# Patient Record
Sex: FEMALE | Race: ASIAN | NOT HISPANIC OR LATINO | URBAN - METROPOLITAN AREA
[De-identification: names, ages, dates, MRNs, and addresses within clinical notes are randomized per-mention and may not be internally consistent; named-entity substitution may affect disease eponyms.]

---

## 2017-07-29 ENCOUNTER — EMERGENCY (EMERGENCY)
Facility: HOSPITAL | Age: 22
LOS: 1 days | Discharge: HOME | End: 2017-07-29
Attending: EMERGENCY MEDICINE | Admitting: EMERGENCY MEDICINE
Payer: MEDICAID

## 2017-07-29 VITALS
RESPIRATION RATE: 16 BRPM | OXYGEN SATURATION: 100 % | SYSTOLIC BLOOD PRESSURE: 108 MMHG | HEART RATE: 82 BPM | TEMPERATURE: 98 F | DIASTOLIC BLOOD PRESSURE: 71 MMHG

## 2017-07-29 VITALS
HEART RATE: 95 BPM | DIASTOLIC BLOOD PRESSURE: 62 MMHG | SYSTOLIC BLOOD PRESSURE: 121 MMHG | RESPIRATION RATE: 16 BRPM | OXYGEN SATURATION: 100 %

## 2017-07-29 LAB
ALBUMIN SERPL ELPH-MCNC: 4.8 G/DL — SIGNIFICANT CHANGE UP (ref 3.3–5)
ALP SERPL-CCNC: 55 U/L — SIGNIFICANT CHANGE UP (ref 40–120)
ALT FLD-CCNC: 10 U/L — SIGNIFICANT CHANGE UP (ref 4–33)
APPEARANCE UR: CLEAR — SIGNIFICANT CHANGE UP
AST SERPL-CCNC: 22 U/L — SIGNIFICANT CHANGE UP (ref 4–32)
BASE EXCESS BLDV CALC-SCNC: 1.7 MMOL/L — SIGNIFICANT CHANGE UP
BASOPHILS # BLD AUTO: 0.03 K/UL — SIGNIFICANT CHANGE UP (ref 0–0.2)
BASOPHILS NFR BLD AUTO: 0.3 % — SIGNIFICANT CHANGE UP (ref 0–2)
BILIRUB SERPL-MCNC: 0.4 MG/DL — SIGNIFICANT CHANGE UP (ref 0.2–1.2)
BILIRUB UR-MCNC: NEGATIVE — SIGNIFICANT CHANGE UP
BLOOD GAS VENOUS - CREATININE: 0.83 MG/DL — SIGNIFICANT CHANGE UP (ref 0.5–1.3)
BLOOD UR QL VISUAL: NEGATIVE — SIGNIFICANT CHANGE UP
BUN SERPL-MCNC: 10 MG/DL — SIGNIFICANT CHANGE UP (ref 7–23)
CALCIUM SERPL-MCNC: 9.7 MG/DL — SIGNIFICANT CHANGE UP (ref 8.4–10.5)
CHLORIDE BLDV-SCNC: 105 MMOL/L — SIGNIFICANT CHANGE UP (ref 96–108)
CHLORIDE SERPL-SCNC: 103 MMOL/L — SIGNIFICANT CHANGE UP (ref 98–107)
CO2 SERPL-SCNC: 23 MMOL/L — SIGNIFICANT CHANGE UP (ref 22–31)
COLOR SPEC: YELLOW — SIGNIFICANT CHANGE UP
CREAT SERPL-MCNC: 0.86 MG/DL — SIGNIFICANT CHANGE UP (ref 0.5–1.3)
EOSINOPHIL # BLD AUTO: 0.06 K/UL — SIGNIFICANT CHANGE UP (ref 0–0.5)
EOSINOPHIL NFR BLD AUTO: 0.6 % — SIGNIFICANT CHANGE UP (ref 0–6)
GAS PNL BLDV: 138 MMOL/L — SIGNIFICANT CHANGE UP (ref 136–146)
GLUCOSE BLDV-MCNC: 89 — SIGNIFICANT CHANGE UP (ref 70–99)
GLUCOSE SERPL-MCNC: 85 MG/DL — SIGNIFICANT CHANGE UP (ref 70–99)
GLUCOSE UR-MCNC: NEGATIVE — SIGNIFICANT CHANGE UP
HCO3 BLDV-SCNC: 24 MMOL/L — SIGNIFICANT CHANGE UP (ref 20–27)
HCT VFR BLD CALC: 37.3 % — SIGNIFICANT CHANGE UP (ref 34.5–45)
HCT VFR BLDV CALC: 39.1 % — SIGNIFICANT CHANGE UP (ref 34.5–45)
HGB BLD-MCNC: 12.5 G/DL — SIGNIFICANT CHANGE UP (ref 11.5–15.5)
HGB BLDV-MCNC: 12.7 G/DL — SIGNIFICANT CHANGE UP (ref 11.5–15.5)
IMM GRANULOCYTES # BLD AUTO: 0.02 # — SIGNIFICANT CHANGE UP
IMM GRANULOCYTES NFR BLD AUTO: 0.2 % — SIGNIFICANT CHANGE UP (ref 0–1.5)
KETONES UR-MCNC: NEGATIVE — SIGNIFICANT CHANGE UP
LACTATE BLDV-MCNC: 2 MMOL/L — SIGNIFICANT CHANGE UP (ref 0.5–2)
LEUKOCYTE ESTERASE UR-ACNC: HIGH
LYMPHOCYTES # BLD AUTO: 2.95 K/UL — SIGNIFICANT CHANGE UP (ref 1–3.3)
LYMPHOCYTES # BLD AUTO: 30.1 % — SIGNIFICANT CHANGE UP (ref 13–44)
MCHC RBC-ENTMCNC: 30.8 PG — SIGNIFICANT CHANGE UP (ref 27–34)
MCHC RBC-ENTMCNC: 33.5 % — SIGNIFICANT CHANGE UP (ref 32–36)
MCV RBC AUTO: 91.9 FL — SIGNIFICANT CHANGE UP (ref 80–100)
MONOCYTES # BLD AUTO: 0.63 K/UL — SIGNIFICANT CHANGE UP (ref 0–0.9)
MONOCYTES NFR BLD AUTO: 6.4 % — SIGNIFICANT CHANGE UP (ref 2–14)
MUCOUS THREADS # UR AUTO: SIGNIFICANT CHANGE UP
NEUTROPHILS # BLD AUTO: 6.12 K/UL — SIGNIFICANT CHANGE UP (ref 1.8–7.4)
NEUTROPHILS NFR BLD AUTO: 62.4 % — SIGNIFICANT CHANGE UP (ref 43–77)
NITRITE UR-MCNC: NEGATIVE — SIGNIFICANT CHANGE UP
NRBC # FLD: 0 — SIGNIFICANT CHANGE UP
PCO2 BLDV: 56 MMHG — HIGH (ref 41–51)
PH BLDV: 7.31 PH — LOW (ref 7.32–7.43)
PH UR: 7.5 — SIGNIFICANT CHANGE UP (ref 4.6–8)
PLATELET # BLD AUTO: 203 K/UL — SIGNIFICANT CHANGE UP (ref 150–400)
PMV BLD: 9.7 FL — SIGNIFICANT CHANGE UP (ref 7–13)
PO2 BLDV: 32 MMHG — LOW (ref 35–40)
POTASSIUM BLDV-SCNC: 3.6 MMOL/L — SIGNIFICANT CHANGE UP (ref 3.4–4.5)
POTASSIUM SERPL-MCNC: 4.1 MMOL/L — SIGNIFICANT CHANGE UP (ref 3.5–5.3)
POTASSIUM SERPL-SCNC: 4.1 MMOL/L — SIGNIFICANT CHANGE UP (ref 3.5–5.3)
PROT SERPL-MCNC: 7.2 G/DL — SIGNIFICANT CHANGE UP (ref 6–8.3)
PROT UR-MCNC: 20 — SIGNIFICANT CHANGE UP
RBC # BLD: 4.06 M/UL — SIGNIFICANT CHANGE UP (ref 3.8–5.2)
RBC # FLD: 12.1 % — SIGNIFICANT CHANGE UP (ref 10.3–14.5)
RBC CASTS # UR COMP ASSIST: SIGNIFICANT CHANGE UP (ref 0–?)
SAO2 % BLDV: 50.8 % — LOW (ref 60–85)
SODIUM SERPL-SCNC: 143 MMOL/L — SIGNIFICANT CHANGE UP (ref 135–145)
SP GR SPEC: 1.03 — SIGNIFICANT CHANGE UP (ref 1–1.03)
SQUAMOUS # UR AUTO: SIGNIFICANT CHANGE UP
UROBILINOGEN FLD QL: NORMAL E.U. — SIGNIFICANT CHANGE UP (ref 0.1–0.2)
WBC # BLD: 9.81 K/UL — SIGNIFICANT CHANGE UP (ref 3.8–10.5)
WBC # FLD AUTO: 9.81 K/UL — SIGNIFICANT CHANGE UP (ref 3.8–10.5)
WBC UR QL: SIGNIFICANT CHANGE UP (ref 0–?)

## 2017-07-29 PROCEDURE — 76705 ECHO EXAM OF ABDOMEN: CPT | Mod: 26

## 2017-07-29 PROCEDURE — 76856 US EXAM PELVIC COMPLETE: CPT | Mod: 26

## 2017-07-29 PROCEDURE — 99285 EMERGENCY DEPT VISIT HI MDM: CPT

## 2017-07-29 RX ORDER — ACETAMINOPHEN 500 MG
1000 TABLET ORAL ONCE
Qty: 0 | Refills: 0 | Status: COMPLETED | OUTPATIENT
Start: 2017-07-29 | End: 2017-07-29

## 2017-07-29 RX ORDER — METOCLOPRAMIDE HCL 10 MG
10 TABLET ORAL ONCE
Qty: 0 | Refills: 0 | Status: DISCONTINUED | OUTPATIENT
Start: 2017-07-29 | End: 2017-07-29

## 2017-07-29 RX ORDER — METOCLOPRAMIDE HCL 10 MG
10 TABLET ORAL ONCE
Qty: 0 | Refills: 0 | Status: COMPLETED | OUTPATIENT
Start: 2017-07-29 | End: 2017-07-29

## 2017-07-29 RX ADMIN — Medication 400 MILLIGRAM(S): at 20:33

## 2017-07-29 NOTE — ED PROVIDER NOTE - ATTENDING CONTRIBUTION TO CARE
I performed a face to face evaluation of this patient and performed a full history and physical examination on the patient.  I agree with the PA history, physical examination, and plan of the patient.  21yo F p/w acute onset RLQ pain beginning last night, constant, worse with movement, no injury, no prior similar, mild nausea, no vomiting/diarrhea/constipation, no urinary symptoms, no vaginal bleeding/discharge, no similar symptoms.  On exam awake & alert, NAD., mmm, lungs CTAB no wheeze no crackle, RRR, abdomen soft R pelvic tenderness no rebound no guarding, (-) obturators, no CVA tenderness, no edema, no calf tenderness, 2+ pulses b/l, neuro A&Ox3, skin warm and dry no rash  Patient declines pelvic exam and requests transabdominal u/s, explained to patient benefit of tv u/s, patient without prior pelvic exam or intercourse.

## 2017-07-29 NOTE — ED PROVIDER NOTE - PHYSICAL EXAMINATION
The patients is deferring pelvic exam - discussed the risks vs benefits - pt has capacity to make medical decisions.

## 2017-07-29 NOTE — ED PROVIDER NOTE - PROGRESS NOTE DETAILS
TOMÁS Diaz: The patients is deferring pelvic exam - discussed the risks vs benefits - pt has capacity to make medical decisions. TOMÁS Diaz: pt NAD, VSS, pain has improved after medication - pt requesting food. Discussed the inconclusive results of the ultrasound - pt has capacity to make her own medical decisions - discussed risks verses benefits of obtaining scan. Pt refusing CT at this time to r/o appendicitis. Pt advised to return immediately if pain worsens or gets fevers.

## 2017-07-29 NOTE — ED PROVIDER NOTE - PLAN OF CARE
Follow up with your primary care physician within 1 week for post hospital discharge. Take Tylenol 650mg every 6 hours as needed for pain. Return to the emergency Department immediately for any new onset of fevers, worsening abdominal pain.

## 2017-07-29 NOTE — ED ADULT NURSE NOTE - CHIEF COMPLAINT
The patient is a 22y Female complaining of abdominal pain since last night, denies n/v/d, fever, chills.

## 2017-07-29 NOTE — ED ADULT NURSE REASSESSMENT NOTE - NS ED NURSE REASSESS COMMENT FT1
US contacted, patient remains in US at this time pending pelvic US. MD aware. Patient agreeable to staying/waiting in uS as per US worker.

## 2017-07-29 NOTE — ED ADULT TRIAGE NOTE - CHIEF COMPLAINT QUOTE
Patient c/o of right lower quadrant pain since last night with nausea no diarrhea. Pt tender on palpation. LMP 7/16/2017

## 2017-07-29 NOTE — ED ADULT NURSE NOTE - OBJECTIVE STATEMENT
Received pt in intake 2, ambulatory, pt A&Ox4, respirations even and unlabored b/l. Abdomen soft, nondistended, tender RLQ. IVL 20g Angiocath placed on left AC. Labs sent. Medicated as per MD order. Awaiting US. Will continue to monitor.

## 2017-07-29 NOTE — ED PROVIDER NOTE - CARE PLAN
Instructions for follow-up, activity and diet:	Follow up with your primary care physician within 1 week for post hospital discharge. Take Tylenol 650mg every 6 hours as needed for pain. Return to the emergency Department immediately for any new onset of fevers, worsening abdominal pain. Principal Discharge DX:	Abdominal pain  Instructions for follow-up, activity and diet:	Follow up with your primary care physician within 1 week for post hospital discharge. Take Tylenol 650mg every 6 hours as needed for pain. Return to the emergency Department immediately for any new onset of fevers, worsening abdominal pain.

## 2017-07-29 NOTE — ED PROVIDER NOTE - MEDICAL DECISION MAKING DETAILS
22 year old female with no pertinent PMHx pw RLQ pain that began last night with associated.  Plan: cbc, cmp, blood gas, reglan, UA, urine culture, US appendix/TVUS

## 2017-07-29 NOTE — ED PROVIDER NOTE - OBJECTIVE STATEMENT
22 year old female with no pertinent PMHx pw RLQ pain that began last night with associated. Pain began periumbilically and migrated to the RLQ, 5/10 in severity, dull in nature - sharp during palpation, worse when coughing, no alleviating factors. LMP 7/16/2016. Last bowel movement this morning - pt was constipated - passing gas. Denies f/c, vomiting, CP, SOB, dysuria, hematuria, diarrhea, melena, hematochezia, vaginal odor/dc/itching, sexual activity, alcohol use, abdominal surgery.

## 2017-07-31 LAB
BACTERIA UR CULT: SIGNIFICANT CHANGE UP
SPECIMEN SOURCE: SIGNIFICANT CHANGE UP

## 2018-11-01 NOTE — ED ADULT NURSE NOTE - PAIN: PRESENCE, MLM
complains of pain/discomfort
Anesthesia Type: 1% lidocaine with 1:200,000 epinephrine
Add 52 Modifier (Optional): no
Medical Necessity Clause: This procedure was medically necessary because the lesions that were treated were:irritated
Consent: The patient's consent was obtained including but not limited to risks of crusting, scabbing, blistering, scarring, darker or lighter pigmentary change, recurrence, incomplete removal and infection.
Medical Necessity Information: It is in your best interest to select a reason for this procedure from the list below. All of these items fulfill various CMS LCD requirements except the new and changing color options.
Anesthesia Volume In Cc: 0.3
Treatment Number (Will Not Render If 0): 0
Detail Level: Detailed
Post-Care Instructions: I reviewed with the patient in detail post-care instructions. Patient is to wear sunprotection, and avoid picking at any of the treated lesions. Pt may apply Vaseline to crusted or scabbing areas.

## 2019-10-03 ENCOUNTER — APPOINTMENT (OUTPATIENT)
Dept: DERMATOLOGY | Facility: CLINIC | Age: 24
End: 2019-10-03
Payer: COMMERCIAL

## 2019-10-03 VITALS — WEIGHT: 145 LBS | HEIGHT: 66 IN | BODY MASS INDEX: 23.3 KG/M2

## 2019-10-03 DIAGNOSIS — L23.9 ALLERGIC CONTACT DERMATITIS, UNSPECIFIED CAUSE: ICD-10-CM

## 2019-10-03 DIAGNOSIS — Z77.22 CONTACT WITH AND (SUSPECTED) EXPOSURE TO ENVIRONMENTAL TOBACCO SMOKE (ACUTE) (CHRONIC): ICD-10-CM

## 2019-10-03 PROBLEM — Z00.00 ENCOUNTER FOR PREVENTIVE HEALTH EXAMINATION: Status: ACTIVE | Noted: 2019-10-03

## 2019-10-03 PROCEDURE — 99203 OFFICE O/P NEW LOW 30 MIN: CPT | Mod: GC

## 2019-10-03 RX ORDER — BETAMETHASONE DIPROPIONATE 0.5 MG/G
0.05 OINTMENT, AUGMENTED TOPICAL
Qty: 1 | Refills: 2 | Status: ACTIVE | COMMUNITY
Start: 2019-10-03 | End: 1900-01-01

## 2019-10-03 RX ORDER — TACROLIMUS 1 MG/G
0.1 OINTMENT TOPICAL
Qty: 1 | Refills: 1 | Status: ACTIVE | COMMUNITY
Start: 2019-10-03 | End: 1900-01-01

## 2019-10-04 ENCOUNTER — MEDICATION RENEWAL (OUTPATIENT)
Age: 24
End: 2019-10-04

## 2019-10-04 RX ORDER — HALOBETASOL PROPIONATE 0.5 MG/G
0.05 OINTMENT TOPICAL
Qty: 1 | Refills: 2 | Status: ACTIVE | COMMUNITY
Start: 2019-10-04 | End: 1900-01-01

## 2022-02-16 NOTE — ED PROVIDER NOTE - CROS ED GI ALL NEG
Patient:   KM BRUCE            MRN: [id number]            FIN: [id number]               Age:   37 years     Sex:  Female     :  1983   Associated Diagnoses:   Viral URI with cough   Author:   Bhavik Epps PA-C      Report Summary   Diagnosis  Viral URI with cough (JED02-EU J06.9).  Patient InstructionsSummary   Visit Information   Visit type:  Telephone Encounter.    Source of history:  Patient.    Location of patient:  Home  Call Start Time:   8:45  Call End Time:    9:00      Chief Complaint   cough      History of Present Illness   Today's visit was conducted via telephone due to the COVID-19 pandemic.     Reason for visit:  Cough, fever. Some chest tightness. No SOB. No underlying respiratory conditions. 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      Review of Systems   Constitutional:  Fever.    Eye:  Negative.    Ear/Nose/Mouth/Throat:  Nasal congestion.    Respiratory:  Cough, No shortness of breath, No wheezing.       Impression and Plan   Diagnosis     Viral URI with cough ([id number] J06.9).     Patient Instructions:       Counseled: Patient, Regarding diagnosis, Regarding treatment, Regarding medications, Activity, Verbalized understanding.    Summary:    Tylenol for fever and discomfort.  Push fluids.  RTC if not improving in 36-48 hours, prior if concerns as we have discussed.  .    Quarantine for 2 weeks.      Health Status   Allergies:    Allergic Reactions (Selected)  No Known Medication Allergies   Medications:  (Selected)   Prescriptions  Prescribed  FLUoxetine 40 mg oral capsule: = 1 cap(s) ( 40 mg ), Oral, daily, # 90 cap(s), 3 Refill(s), Type: Maintenance, Pharmacy: [id number], 1 cap(s) Oral daily   Problem list:    All Problems  Obese / ICD-9-.00 / Probable  Moderate Major Depression / ICD-9-.22 / Confirmed  Macromastia / SNOMED CT 5211928063 / Confirmed  Resolved: Tobacco user / ICD-9-.1  Resolved: Pregnancy / SNOMED CT 165020844  
   Patient:   KM BRUCE            MRN: [id number]            FIN: [id number]               Age:   37 years     Sex:  Female     :  1983   Associated Diagnoses:   None   Author:   Bhavik Epps PA-C       -   Today's date:  3/24/2020 8:59:00 AM .        -   To whom it may concern:        This patient is currently under my care and Was seen in my office on  3/24/2020.  .     Please excuse him/ her from work, for the next  2  weeks, Off work for suspected COVID-19 infection..      -   Best regards,  
- - -

## 2022-09-09 ENCOUNTER — NON-APPOINTMENT (OUTPATIENT)
Age: 27
End: 2022-09-09

## 2022-09-14 ENCOUNTER — APPOINTMENT (OUTPATIENT)
Dept: OBGYN | Facility: CLINIC | Age: 27
End: 2022-09-14

## 2022-09-14 ENCOUNTER — ASOB RESULT (OUTPATIENT)
Age: 27
End: 2022-09-14

## 2022-09-14 VITALS
WEIGHT: 166 LBS | SYSTOLIC BLOOD PRESSURE: 108 MMHG | HEIGHT: 66 IN | DIASTOLIC BLOOD PRESSURE: 73 MMHG | BODY MASS INDEX: 26.68 KG/M2

## 2022-09-14 DIAGNOSIS — Z3A.08 8 WEEKS GESTATION OF PREGNANCY: ICD-10-CM

## 2022-09-14 PROCEDURE — 0500F INITIAL PRENATAL CARE VISIT: CPT

## 2022-09-14 PROCEDURE — 36415 COLL VENOUS BLD VENIPUNCTURE: CPT

## 2022-09-17 ENCOUNTER — TRANSCRIPTION ENCOUNTER (OUTPATIENT)
Age: 27
End: 2022-09-17

## 2022-09-26 LAB
ABO + RH PNL BLD: NORMAL
ALBUMIN SERPL ELPH-MCNC: 4.8 G/DL
ALP BLD-CCNC: 58 U/L
ALT SERPL-CCNC: 8 U/L
ANION GAP SERPL CALC-SCNC: 18 MMOL/L
AST SERPL-CCNC: 19 U/L
BACTERIA UR CULT: NORMAL
BASOPHILS # BLD AUTO: 0.03 K/UL
BASOPHILS NFR BLD AUTO: 0.4 %
BILIRUB SERPL-MCNC: 0.3 MG/DL
BLD GP AB SCN SERPL QL: NORMAL
BUN SERPL-MCNC: 5 MG/DL
C TRACH RRNA SPEC QL NAA+PROBE: NOT DETECTED
CALCIUM SERPL-MCNC: 9.8 MG/DL
CHLORIDE SERPL-SCNC: 102 MMOL/L
CO2 SERPL-SCNC: 20 MMOL/L
CREAT SERPL-MCNC: 0.63 MG/DL
CYTOLOGY CVX/VAG DOC THIN PREP: NORMAL
EGFR: 125 ML/MIN/1.73M2
EOSINOPHIL # BLD AUTO: 0.1 K/UL
EOSINOPHIL NFR BLD AUTO: 1.5 %
GLUCOSE SERPL-MCNC: 53 MG/DL
HBV SURFACE AG SER QL: NONREACTIVE
HCT VFR BLD CALC: 39 %
HCV AB SER QL: NONREACTIVE
HCV S/CO RATIO: 0.09 S/CO
HGB A MFR BLD: 97.4 %
HGB A2 MFR BLD: 2.6 %
HGB BLD-MCNC: 12.8 G/DL
HGB FRACT BLD-IMP: NORMAL
HIV1+2 AB SPEC QL IA.RAPID: NONREACTIVE
IMM GRANULOCYTES NFR BLD AUTO: 0.3 %
LEAD BLD-MCNC: <1 UG/DL
LYMPHOCYTES # BLD AUTO: 1.58 K/UL
LYMPHOCYTES NFR BLD AUTO: 23.3 %
MAN DIFF?: NORMAL
MCHC RBC-ENTMCNC: 31.1 PG
MCHC RBC-ENTMCNC: 32.8 GM/DL
MCV RBC AUTO: 94.9 FL
MEV IGG FLD QL IA: >300 AU/ML
MEV IGG+IGM SER-IMP: POSITIVE
MONOCYTES # BLD AUTO: 0.56 K/UL
MONOCYTES NFR BLD AUTO: 8.3 %
N GONORRHOEA RRNA SPEC QL NAA+PROBE: NOT DETECTED
NEUTROPHILS # BLD AUTO: 4.48 K/UL
NEUTROPHILS NFR BLD AUTO: 66.2 %
PLATELET # BLD AUTO: 259 K/UL
POTASSIUM SERPL-SCNC: 3.8 MMOL/L
PROT SERPL-MCNC: 7.2 G/DL
RBC # BLD: 4.11 M/UL
RBC # FLD: 13.3 %
RUBV IGG FLD-ACNC: 5.6 INDEX
RUBV IGG SER-IMP: POSITIVE
SODIUM SERPL-SCNC: 139 MMOL/L
SOURCE TP AMPLIFICATION: NORMAL
T PALLIDUM AB SER QL IA: NEGATIVE
T4 FREE SERPL-MCNC: 1.4 NG/DL
TSH SERPL-ACNC: 0.91 UIU/ML
VZV AB TITR SER: POSITIVE
VZV IGG SER IF-ACNC: 217.3 INDEX
WBC # FLD AUTO: 6.77 K/UL

## 2022-10-07 ENCOUNTER — APPOINTMENT (OUTPATIENT)
Dept: OBGYN | Facility: CLINIC | Age: 27
End: 2022-10-07

## 2022-10-07 VITALS
BODY MASS INDEX: 26.84 KG/M2 | DIASTOLIC BLOOD PRESSURE: 75 MMHG | SYSTOLIC BLOOD PRESSURE: 109 MMHG | WEIGHT: 167 LBS | HEIGHT: 66 IN

## 2022-10-07 DIAGNOSIS — Z3A.10 10 WEEKS GESTATION OF PREGNANCY: ICD-10-CM

## 2022-10-07 PROCEDURE — 0502F SUBSEQUENT PRENATAL CARE: CPT

## 2022-10-07 PROCEDURE — 36415 COLL VENOUS BLD VENIPUNCTURE: CPT

## 2022-10-14 ENCOUNTER — EMERGENCY (EMERGENCY)
Facility: HOSPITAL | Age: 27
LOS: 1 days | Discharge: HOME | End: 2022-10-14
Attending: EMERGENCY MEDICINE
Payer: COMMERCIAL

## 2022-10-14 PROCEDURE — 99285 EMERGENCY DEPT VISIT HI MDM: CPT

## 2022-10-15 VITALS
HEART RATE: 86 BPM | OXYGEN SATURATION: 100 % | RESPIRATION RATE: 16 BRPM | DIASTOLIC BLOOD PRESSURE: 72 MMHG | SYSTOLIC BLOOD PRESSURE: 120 MMHG | TEMPERATURE: 98 F

## 2022-10-15 VITALS
RESPIRATION RATE: 17 BRPM | HEART RATE: 122 BPM | OXYGEN SATURATION: 100 % | SYSTOLIC BLOOD PRESSURE: 153 MMHG | TEMPERATURE: 98 F | HEIGHT: 66 IN | WEIGHT: 166.89 LBS | DIASTOLIC BLOOD PRESSURE: 82 MMHG

## 2022-10-15 LAB
ALBUMIN SERPL ELPH-MCNC: 4.2 G/DL — SIGNIFICANT CHANGE UP (ref 3.3–5)
ALP SERPL-CCNC: 54 U/L — SIGNIFICANT CHANGE UP (ref 40–120)
ALT FLD-CCNC: 9 U/L — LOW (ref 10–45)
ANION GAP SERPL CALC-SCNC: 10 MMOL/L — SIGNIFICANT CHANGE UP (ref 5–17)
APPEARANCE UR: CLEAR — SIGNIFICANT CHANGE UP
AST SERPL-CCNC: 14 U/L — SIGNIFICANT CHANGE UP (ref 10–40)
BACTERIA # UR AUTO: NEGATIVE — SIGNIFICANT CHANGE UP
BASOPHILS # BLD AUTO: 0.02 K/UL — SIGNIFICANT CHANGE UP (ref 0–0.2)
BASOPHILS NFR BLD AUTO: 0.2 % — SIGNIFICANT CHANGE UP (ref 0–2)
BILIRUB SERPL-MCNC: 0.1 MG/DL — LOW (ref 0.2–1.2)
BILIRUB UR-MCNC: NEGATIVE — SIGNIFICANT CHANGE UP
BUN SERPL-MCNC: 8 MG/DL — SIGNIFICANT CHANGE UP (ref 7–23)
CALCIUM SERPL-MCNC: 9.2 MG/DL — SIGNIFICANT CHANGE UP (ref 8.4–10.5)
CHLORIDE SERPL-SCNC: 106 MMOL/L — SIGNIFICANT CHANGE UP (ref 96–108)
CO2 SERPL-SCNC: 22 MMOL/L — SIGNIFICANT CHANGE UP (ref 22–31)
COLOR SPEC: COLORLESS — SIGNIFICANT CHANGE UP
CREAT SERPL-MCNC: 0.57 MG/DL — SIGNIFICANT CHANGE UP (ref 0.5–1.3)
DIFF PNL FLD: ABNORMAL
EGFR: 128 ML/MIN/1.73M2 — SIGNIFICANT CHANGE UP
EOSINOPHIL # BLD AUTO: 0.19 K/UL — SIGNIFICANT CHANGE UP (ref 0–0.5)
EOSINOPHIL NFR BLD AUTO: 2 % — SIGNIFICANT CHANGE UP (ref 0–6)
EPI CELLS # UR: 1 /HPF — SIGNIFICANT CHANGE UP
GLUCOSE SERPL-MCNC: 86 MG/DL — SIGNIFICANT CHANGE UP (ref 70–99)
GLUCOSE UR QL: NEGATIVE — SIGNIFICANT CHANGE UP
HCG SERPL-ACNC: HIGH MIU/ML
HCT VFR BLD CALC: 35.4 % — SIGNIFICANT CHANGE UP (ref 34.5–45)
HGB BLD-MCNC: 11.7 G/DL — SIGNIFICANT CHANGE UP (ref 11.5–15.5)
HYALINE CASTS # UR AUTO: 1 /LPF — SIGNIFICANT CHANGE UP (ref 0–2)
IMM GRANULOCYTES NFR BLD AUTO: 0.4 % — SIGNIFICANT CHANGE UP (ref 0–0.9)
KETONES UR-MCNC: NEGATIVE — SIGNIFICANT CHANGE UP
LEUKOCYTE ESTERASE UR-ACNC: ABNORMAL
LYMPHOCYTES # BLD AUTO: 2.01 K/UL — SIGNIFICANT CHANGE UP (ref 1–3.3)
LYMPHOCYTES # BLD AUTO: 20.8 % — SIGNIFICANT CHANGE UP (ref 13–44)
MCHC RBC-ENTMCNC: 30.5 PG — SIGNIFICANT CHANGE UP (ref 27–34)
MCHC RBC-ENTMCNC: 33.1 GM/DL — SIGNIFICANT CHANGE UP (ref 32–36)
MCV RBC AUTO: 92.4 FL — SIGNIFICANT CHANGE UP (ref 80–100)
MONOCYTES # BLD AUTO: 0.77 K/UL — SIGNIFICANT CHANGE UP (ref 0–0.9)
MONOCYTES NFR BLD AUTO: 8 % — SIGNIFICANT CHANGE UP (ref 2–14)
NEUTROPHILS # BLD AUTO: 6.63 K/UL — SIGNIFICANT CHANGE UP (ref 1.8–7.4)
NEUTROPHILS NFR BLD AUTO: 68.6 % — SIGNIFICANT CHANGE UP (ref 43–77)
NITRITE UR-MCNC: NEGATIVE — SIGNIFICANT CHANGE UP
NRBC # BLD: 0 /100 WBCS — SIGNIFICANT CHANGE UP (ref 0–0)
PH UR: 6.5 — SIGNIFICANT CHANGE UP (ref 5–8)
PLATELET # BLD AUTO: 258 K/UL — SIGNIFICANT CHANGE UP (ref 150–400)
POTASSIUM SERPL-MCNC: 3.6 MMOL/L — SIGNIFICANT CHANGE UP (ref 3.5–5.3)
POTASSIUM SERPL-SCNC: 3.6 MMOL/L — SIGNIFICANT CHANGE UP (ref 3.5–5.3)
PROT SERPL-MCNC: 7.1 G/DL — SIGNIFICANT CHANGE UP (ref 6–8.3)
PROT UR-MCNC: NEGATIVE — SIGNIFICANT CHANGE UP
RBC # BLD: 3.83 M/UL — SIGNIFICANT CHANGE UP (ref 3.8–5.2)
RBC # FLD: 13.1 % — SIGNIFICANT CHANGE UP (ref 10.3–14.5)
RBC CASTS # UR COMP ASSIST: 2 /HPF — SIGNIFICANT CHANGE UP (ref 0–4)
SODIUM SERPL-SCNC: 138 MMOL/L — SIGNIFICANT CHANGE UP (ref 135–145)
SP GR SPEC: 1 — LOW (ref 1.01–1.02)
UROBILINOGEN FLD QL: NEGATIVE — SIGNIFICANT CHANGE UP
WBC # BLD: 9.66 K/UL — SIGNIFICANT CHANGE UP (ref 3.8–10.5)
WBC # FLD AUTO: 9.66 K/UL — SIGNIFICANT CHANGE UP (ref 3.8–10.5)
WBC UR QL: 3 /HPF — SIGNIFICANT CHANGE UP (ref 0–5)

## 2022-10-15 PROCEDURE — 36415 COLL VENOUS BLD VENIPUNCTURE: CPT

## 2022-10-15 PROCEDURE — 76817 TRANSVAGINAL US OBSTETRIC: CPT | Mod: 26

## 2022-10-15 PROCEDURE — 87086 URINE CULTURE/COLONY COUNT: CPT

## 2022-10-15 PROCEDURE — 99284 EMERGENCY DEPT VISIT MOD MDM: CPT | Mod: 25

## 2022-10-15 PROCEDURE — 85025 COMPLETE CBC W/AUTO DIFF WBC: CPT

## 2022-10-15 PROCEDURE — 76801 OB US < 14 WKS SINGLE FETUS: CPT

## 2022-10-15 PROCEDURE — 80053 COMPREHEN METABOLIC PANEL: CPT

## 2022-10-15 PROCEDURE — 84702 CHORIONIC GONADOTROPIN TEST: CPT

## 2022-10-15 PROCEDURE — 81001 URINALYSIS AUTO W/SCOPE: CPT

## 2022-10-15 PROCEDURE — 76817 TRANSVAGINAL US OBSTETRIC: CPT

## 2022-10-15 PROCEDURE — 76801 OB US < 14 WKS SINGLE FETUS: CPT | Mod: 26

## 2022-10-15 NOTE — ED PROVIDER NOTE - PATIENT PORTAL LINK FT
You can access the FollowMyHealth Patient Portal offered by [facility] by registering at the following website: [email or web address]. By joining [employer]’s FollowMyHealth portal, you will also be able to view your health information using other applications (apps) compatible with our system. You can access the FollowMyHealth Patient Portal offered by [facility] by registering at the following website: [email or web address]. By joining [employer]’s FollowMyHealth portal, you will also be able to view your health information using other applications (apps) compatible with our system. You can access the FollowMyHealth Patient Portal offered by [facility] by registering at the following website: [email or web address]. By joining [employer]’s FollowMyHealth portal, you will also be able to view your health information using other applications (apps) compatible with our system.

## 2022-10-15 NOTE — ED PROVIDER NOTE - PHYSICAL EXAMINATION
PHYSICAL EXAM:  GENERAL: Sitting comfortable in bed, in no acute distress  HENMT: Atraumatic, moist mucous membranes EYES: Clear bilaterally, PERRL, EOMs intact b/l  HEART: RRR, S1/S2, no murmur  RESPIRATORY: Clear to auscultation bilaterally, no wheezes/rhonchi/rales  ABDOMEN: +BS, soft, nontender, nondistended  PELVIC EXAM: with RN -   EXTREMITIES: No lower extremity edema, +2 radial pulses b/l  NEURO: A&Ox4  SKIN: Skin normal color for race, warm, dry and intact PHYSICAL EXAM:  GENERAL: Sitting comfortable in bed, in no acute distress  HENMT: Atraumatic, moist mucous membranes EYES: Clear bilaterally, PERRL, EOMs intact b/l  HEART: RRR, S1/S2, no murmur  RESPIRATORY: Clear to auscultation bilaterally, no wheezes/rhonchi/rales  ABDOMEN: +BS, soft, nontender, nondistended  PELVIC EXAM: with RN - normal external genitalia, normal vaginal vault with some blood, cervix could not be visualized, no cervical motion tenderness, no adnexal tenderness  EXTREMITIES: No lower extremity edema, +2 radial pulses b/l  NEURO: A&Ox4  SKIN: Skin normal color for race, warm, dry and intact

## 2022-10-15 NOTE — ED PROVIDER NOTE - ATTENDING CONTRIBUTION TO CARE
27yr F G1 11wk US confirmed IUP presented with vaginal spotting. denies cramps fever chills, feeling unwell, cp, sob, abd pain or diarrhea, or urinary sx. has report of O + blood in chart. exam notable for well appearing, neuro grossly intact, clear lungs, s1-2 soft non tender abd, no peripheral edema.  plan for pelvic, US, labs, and if work up unremarkable. advise regarding threatened  in 1st trimester. pt is comfortable following up as outpt.

## 2022-10-15 NOTE — ED PROVIDER NOTE - NSFOLLOWUPINSTRUCTIONS_ED_ALL_ED_FT
You have been evaluated in the Emergency Department today for vaginal bleeding in the setting of pregnancy.     Please see your Obstetrician on Monday to follow-up.    Return to the Emergency Department if you experience heavy vaginal bleeding, abdominal pain or cramping, fevers 100.4°F or greater, recurrent vomiting, dizziness, or any other concerning symptoms.    Thank you for choosing us for your care.

## 2022-10-15 NOTE — ED PROVIDER NOTE - PROGRESS NOTE DETAILS
Lona De La Rosa M.D. (Resident Physician): US did not show any abnormalities. Pt still reports no pain or cramping. Will f/u with her OB on Monday and has been given return precautions. Ready for d/c home.

## 2022-10-15 NOTE — ED ADULT NURSE NOTE - OBJECTIVE STATEMENT
28y/o f coming to the ED c.o vaginal bleeding. Pt states she is 11weeks pregnant and noted @ 11pm tonight she had some blood when she wiped after urinating. Pt denies any need for using a pad, denies abdominal pain/cramping. Pt denies any CP/SOB/Fever/Chills/N/V/D. On exam, abdomen soft, nontender, nondistended. IV placed, labs collected and sent. VSS, 26y/o f coming to the ED c.o vaginal bleeding. Pt states she is 11weeks pregnant and noted @ 11pm tonight she had some blood when she wiped after urinating. Pt denies any need for using a pad, denies abdominal pain/cramping. Pt denies any CP/SOB/Fever/Chills/N/V/D. On exam, abdomen soft, nontender, nondistended. IV placed, labs collected and sent. VSS,

## 2022-10-15 NOTE — ED PROVIDER NOTE - CLINICAL SUMMARY MEDICAL DECISION MAKING FREE TEXT BOX
27F  at 11w5d p/w vaginal spotting. Vitals stable, bp 124/78. On exam, abdomen soft nontender, pelvic exam - . DDx: benign vaginal spotting vs . Plan: blood work, UA/UC, US. Will re-assess. 27F  at 11w5d p/w vaginal spotting. Vitals stable, bp 124/78. On exam, abdomen soft nontender, pelvic exam - normal external genitalia, normal vaginal vault with some blood, cervix could not be visualized, no cervical motion tenderness, no adnexal tenderness. DDx: benign vaginal spotting vs . Plan: blood work, UA/UC, US. Will re-assess.

## 2022-10-15 NOTE — ED PROVIDER NOTE - OBJECTIVE STATEMENT
27F  at 11w5d PMH asthma p/w vaginal spotting. Pt says the vaginal spotting started a few hours ago. She denies any cramping or abdominal pain. No complications with the pregnancy so far. Follows with OB Dr. Olivo. Confirmed intrauterine pregnancy. No N/V, chest pain, SOB, hematuria, dysuria. LMP . Pt is O+ from [facility]. 27F  at 11w5d PMH asthma p/w vaginal spotting. Pt says the vaginal spotting started a few hours ago. She denies any cramping or abdominal pain. No complications with the pregnancy so far. Follows with OB Dr. Olivo. Confirmed intrauterine pregnancy. No N/V, chest pain, SOB, hematuria, dysuria. LMP . Pt is O+ from [facility]. 27F  at 11w5d PMH asthma p/w vaginal spotting. Pt says the vaginal spotting started a few hours ago. She denies any cramping or abdominal pain. No complications with the pregnancy so far. Follows with OB Dr. Olivo. Confirmed intrauterine pregnancy. No N/V, chest pain, SOB, hematuria, dysuria. LMP . Pt is O+ from [facility].

## 2022-10-15 NOTE — ED ADULT NURSE NOTE - NSIMPLEMENTINTERV_GEN_ALL_ED
Implemented All Universal Safety Interventions:  [place] to call system. Call bell, personal items and telephone within reach. Instruct patient to call for assistance. Room bathroom lighting operational. Non-slip footwear when patient is off stretcher. Physically safe environment: no spills, clutter or unnecessary equipment. Stretcher in lowest position, wheels locked, appropriate side rails in place. Implemented All Universal Safety Interventions:  [place] to call system. Call bell, personal items and telephone within reach. Instruct patient to call for assistance. Room bathroom lighting operational. Non-slip footwear when patient is off stretcher. Physically safe environment: no spills, clutter or unnecessary equipment. Stretcher in lowest position, wheels locked, appropriate side rails in place. Implemented All Universal Safety Interventions:  [place] to call system. Call bell, personal items and telephone within reach. Instruct patient to call for assistance. Room bathroom lighting operational. Non-slip footwear when patient is off stretcher. Physically safe environment: no spills, clutter or unnecessary equipment. Stretcher in lowest position, wheels locked, appropriate side rails in place.

## 2022-10-16 LAB
CULTURE RESULTS: SIGNIFICANT CHANGE UP
SPECIMEN SOURCE: SIGNIFICANT CHANGE UP

## 2022-10-17 ENCOUNTER — APPOINTMENT (OUTPATIENT)
Dept: OBGYN | Facility: CLINIC | Age: 27
End: 2022-10-17

## 2022-10-17 ENCOUNTER — ASOB RESULT (OUTPATIENT)
Age: 27
End: 2022-10-17

## 2022-10-17 VITALS
BODY MASS INDEX: 26.36 KG/M2 | DIASTOLIC BLOOD PRESSURE: 75 MMHG | HEIGHT: 66 IN | SYSTOLIC BLOOD PRESSURE: 118 MMHG | WEIGHT: 164 LBS

## 2022-10-17 DIAGNOSIS — Z34.91 ENCOUNTER FOR SUPERVISION OF NORMAL PREGNANCY, UNSPECIFIED, FIRST TRIMESTER: ICD-10-CM

## 2022-10-17 DIAGNOSIS — Z3A.12 12 WEEKS GESTATION OF PREGNANCY: ICD-10-CM

## 2022-10-17 PROCEDURE — 0502F SUBSEQUENT PRENATAL CARE: CPT

## 2022-10-17 PROCEDURE — 76813 OB US NUCHAL MEAS 1 GEST: CPT

## 2022-11-10 ENCOUNTER — APPOINTMENT (OUTPATIENT)
Dept: OBGYN | Facility: CLINIC | Age: 27
End: 2022-11-10

## 2022-11-10 ENCOUNTER — NON-APPOINTMENT (OUTPATIENT)
Age: 27
End: 2022-11-10

## 2022-11-10 ENCOUNTER — ASOB RESULT (OUTPATIENT)
Age: 27
End: 2022-11-10

## 2022-11-10 VITALS — BODY MASS INDEX: 27.12 KG/M2 | DIASTOLIC BLOOD PRESSURE: 72 MMHG | WEIGHT: 168 LBS | SYSTOLIC BLOOD PRESSURE: 107 MMHG

## 2022-11-10 DIAGNOSIS — Z3A.16 16 WEEKS GESTATION OF PREGNANCY: ICD-10-CM

## 2022-11-10 PROCEDURE — 0502F SUBSEQUENT PRENATAL CARE: CPT

## 2022-11-10 PROCEDURE — 76815 OB US LIMITED FETUS(S): CPT

## 2022-11-12 LAB
AFP MOM: 0.89
AFP VALUE: 21.9 NG/ML
ALPHA FETOPROTEIN SERUM COMMENT: NORMAL
ALPHA FETOPROTEIN SERUM INTERPRETATION: NORMAL
ALPHA FETOPROTEIN SERUM RESULTS: NORMAL
ALPHA FETOPROTEIN SERUM TEST RESULTS: NORMAL
GESTATIONAL AGE BASED ON: NORMAL
GESTATIONAL AGE ON COLLECTION DATE: 15.6 WEEKS
INSULIN DEP DIABETES: YES
MATERNAL AGE AT EDD AFP: 26.9 YR
MULTIPLE GESTATION: NO
OSBR RISK 1 IN: 5529
RACE: NORMAL
WEIGHT AFP: 168 LBS

## 2022-12-15 ENCOUNTER — ASOB RESULT (OUTPATIENT)
Age: 27
End: 2022-12-15

## 2022-12-15 ENCOUNTER — APPOINTMENT (OUTPATIENT)
Dept: OBGYN | Facility: CLINIC | Age: 27
End: 2022-12-15

## 2022-12-15 VITALS — SYSTOLIC BLOOD PRESSURE: 124 MMHG | BODY MASS INDEX: 27.12 KG/M2 | DIASTOLIC BLOOD PRESSURE: 82 MMHG | WEIGHT: 168 LBS

## 2022-12-15 PROCEDURE — 76805 OB US >/= 14 WKS SNGL FETUS: CPT

## 2022-12-15 PROCEDURE — 0502F SUBSEQUENT PRENATAL CARE: CPT

## 2023-01-13 ENCOUNTER — APPOINTMENT (OUTPATIENT)
Dept: OBGYN | Facility: CLINIC | Age: 28
End: 2023-01-13
Payer: COMMERCIAL

## 2023-01-13 VITALS
HEART RATE: 101 BPM | SYSTOLIC BLOOD PRESSURE: 105 MMHG | WEIGHT: 169 LBS | DIASTOLIC BLOOD PRESSURE: 69 MMHG | BODY MASS INDEX: 27.28 KG/M2

## 2023-01-13 PROCEDURE — 0502F SUBSEQUENT PRENATAL CARE: CPT

## 2023-02-09 ENCOUNTER — APPOINTMENT (OUTPATIENT)
Dept: OBGYN | Facility: CLINIC | Age: 28
End: 2023-02-09
Payer: COMMERCIAL

## 2023-02-09 ENCOUNTER — ASOB RESULT (OUTPATIENT)
Age: 28
End: 2023-02-09

## 2023-02-09 ENCOUNTER — MED ADMIN CHARGE (OUTPATIENT)
Age: 28
End: 2023-02-09

## 2023-02-09 VITALS
WEIGHT: 171 LBS | SYSTOLIC BLOOD PRESSURE: 120 MMHG | BODY MASS INDEX: 27.48 KG/M2 | HEIGHT: 66 IN | DIASTOLIC BLOOD PRESSURE: 75 MMHG

## 2023-02-09 DIAGNOSIS — Z3A.28 28 WEEKS GESTATION OF PREGNANCY: ICD-10-CM

## 2023-02-09 LAB
BASOPHILS # BLD AUTO: 0.02 K/UL
BASOPHILS NFR BLD AUTO: 0.2 %
EOSINOPHIL # BLD AUTO: 0.1 K/UL
EOSINOPHIL NFR BLD AUTO: 1.1 %
HCT VFR BLD CALC: 33.4 %
HGB BLD-MCNC: 10.9 G/DL
IMM GRANULOCYTES NFR BLD AUTO: 1.3 %
LYMPHOCYTES # BLD AUTO: 1.35 K/UL
LYMPHOCYTES NFR BLD AUTO: 14.4 %
MAN DIFF?: NORMAL
MCHC RBC-ENTMCNC: 32.1 PG
MCHC RBC-ENTMCNC: 32.6 GM/DL
MCV RBC AUTO: 98.2 FL
MONOCYTES # BLD AUTO: 0.57 K/UL
MONOCYTES NFR BLD AUTO: 6.1 %
NEUTROPHILS # BLD AUTO: 7.24 K/UL
NEUTROPHILS NFR BLD AUTO: 76.9 %
PLATELET # BLD AUTO: 209 K/UL
RBC # BLD: 3.4 M/UL
RBC # FLD: 14 %
WBC # FLD AUTO: 9.4 K/UL

## 2023-02-09 PROCEDURE — 36415 COLL VENOUS BLD VENIPUNCTURE: CPT

## 2023-02-09 PROCEDURE — 0502F SUBSEQUENT PRENATAL CARE: CPT

## 2023-02-10 DIAGNOSIS — Z34.90 ENCOUNTER FOR SUPERVISION OF NORMAL PREGNANCY, UNSPECIFIED, UNSPECIFIED TRIMESTER: ICD-10-CM

## 2023-02-10 LAB
BLD GP AB SCN SERPL QL: NORMAL
GLUCOSE 1H P 50 G GLC PO SERPL-MCNC: 142 MG/DL
HIV1+2 AB SPEC QL IA.RAPID: NONREACTIVE

## 2023-03-07 ENCOUNTER — APPOINTMENT (OUTPATIENT)
Dept: OBGYN | Facility: CLINIC | Age: 28
End: 2023-03-07
Payer: COMMERCIAL

## 2023-03-07 ENCOUNTER — ASOB RESULT (OUTPATIENT)
Age: 28
End: 2023-03-07

## 2023-03-07 VITALS — BODY MASS INDEX: 28.25 KG/M2 | SYSTOLIC BLOOD PRESSURE: 106 MMHG | DIASTOLIC BLOOD PRESSURE: 69 MMHG | WEIGHT: 175 LBS

## 2023-03-07 DIAGNOSIS — Z3A.32 32 WEEKS GESTATION OF PREGNANCY: ICD-10-CM

## 2023-03-07 PROCEDURE — 76819 FETAL BIOPHYS PROFIL W/O NST: CPT | Mod: 59

## 2023-03-07 PROCEDURE — 0502F SUBSEQUENT PRENATAL CARE: CPT

## 2023-03-07 PROCEDURE — 76816 OB US FOLLOW-UP PER FETUS: CPT

## 2023-03-24 ENCOUNTER — APPOINTMENT (OUTPATIENT)
Dept: OBGYN | Facility: CLINIC | Age: 28
End: 2023-03-24
Payer: COMMERCIAL

## 2023-03-24 VITALS
HEART RATE: 109 BPM | BODY MASS INDEX: 28.41 KG/M2 | WEIGHT: 176 LBS | SYSTOLIC BLOOD PRESSURE: 112 MMHG | DIASTOLIC BLOOD PRESSURE: 75 MMHG

## 2023-03-24 DIAGNOSIS — Z3A.34 34 WEEKS GESTATION OF PREGNANCY: ICD-10-CM

## 2023-03-24 PROCEDURE — 0502F SUBSEQUENT PRENATAL CARE: CPT

## 2023-04-07 ENCOUNTER — APPOINTMENT (OUTPATIENT)
Dept: OBGYN | Facility: CLINIC | Age: 28
End: 2023-04-07
Payer: COMMERCIAL

## 2023-04-07 ENCOUNTER — ASOB RESULT (OUTPATIENT)
Age: 28
End: 2023-04-07

## 2023-04-07 VITALS — BODY MASS INDEX: 28.89 KG/M2 | WEIGHT: 179 LBS | SYSTOLIC BLOOD PRESSURE: 126 MMHG | DIASTOLIC BLOOD PRESSURE: 81 MMHG

## 2023-04-07 DIAGNOSIS — Z3A.36 36 WEEKS GESTATION OF PREGNANCY: ICD-10-CM

## 2023-04-07 PROCEDURE — 76816 OB US FOLLOW-UP PER FETUS: CPT

## 2023-04-07 PROCEDURE — 0502F SUBSEQUENT PRENATAL CARE: CPT

## 2023-04-07 PROCEDURE — 76819 FETAL BIOPHYS PROFIL W/O NST: CPT | Mod: 59

## 2023-04-10 LAB — B-HEM STREP SPEC QL CULT: NORMAL

## 2023-04-14 ENCOUNTER — APPOINTMENT (OUTPATIENT)
Dept: OBGYN | Facility: CLINIC | Age: 28
End: 2023-04-14
Payer: COMMERCIAL

## 2023-04-14 VITALS — SYSTOLIC BLOOD PRESSURE: 103 MMHG | DIASTOLIC BLOOD PRESSURE: 69 MMHG | BODY MASS INDEX: 28.57 KG/M2 | WEIGHT: 177 LBS

## 2023-04-14 DIAGNOSIS — Z3A.37 37 WEEKS GESTATION OF PREGNANCY: ICD-10-CM

## 2023-04-14 PROCEDURE — 0502F SUBSEQUENT PRENATAL CARE: CPT

## 2023-04-20 ENCOUNTER — APPOINTMENT (OUTPATIENT)
Dept: OBGYN | Facility: CLINIC | Age: 28
End: 2023-04-20
Payer: COMMERCIAL

## 2023-04-20 ENCOUNTER — ASOB RESULT (OUTPATIENT)
Age: 28
End: 2023-04-20

## 2023-04-20 VITALS — SYSTOLIC BLOOD PRESSURE: 121 MMHG | BODY MASS INDEX: 29.05 KG/M2 | DIASTOLIC BLOOD PRESSURE: 81 MMHG | WEIGHT: 180 LBS

## 2023-04-20 DIAGNOSIS — Z3A.38 38 WEEKS GESTATION OF PREGNANCY: ICD-10-CM

## 2023-04-20 PROCEDURE — 76816 OB US FOLLOW-UP PER FETUS: CPT

## 2023-04-20 PROCEDURE — 76819 FETAL BIOPHYS PROFIL W/O NST: CPT | Mod: 59

## 2023-04-20 PROCEDURE — 0502F SUBSEQUENT PRENATAL CARE: CPT

## 2023-04-26 ENCOUNTER — OUTPATIENT (OUTPATIENT)
Dept: OUTPATIENT SERVICES | Facility: HOSPITAL | Age: 28
LOS: 1 days | End: 2023-04-26
Payer: COMMERCIAL

## 2023-04-26 VITALS — OXYGEN SATURATION: 97 %

## 2023-04-26 VITALS
DIASTOLIC BLOOD PRESSURE: 70 MMHG | RESPIRATION RATE: 18 BRPM | SYSTOLIC BLOOD PRESSURE: 118 MMHG | TEMPERATURE: 98 F | OXYGEN SATURATION: 99 % | HEART RATE: 80 BPM

## 2023-04-26 DIAGNOSIS — O26.899 OTHER SPECIFIED PREGNANCY RELATED CONDITIONS, UNSPECIFIED TRIMESTER: ICD-10-CM

## 2023-04-26 LAB
ALBUMIN SERPL ELPH-MCNC: 3.9 G/DL — SIGNIFICANT CHANGE UP (ref 3.3–5)
ALP SERPL-CCNC: 183 U/L — HIGH (ref 40–120)
ALT FLD-CCNC: 9 U/L — LOW (ref 10–45)
AMYLASE P1 CFR SERPL: 99 U/L — SIGNIFICANT CHANGE UP (ref 25–125)
ANION GAP SERPL CALC-SCNC: 14 MMOL/L — SIGNIFICANT CHANGE UP (ref 5–17)
AST SERPL-CCNC: 19 U/L — SIGNIFICANT CHANGE UP (ref 10–40)
BASOPHILS # BLD AUTO: 0.04 K/UL — SIGNIFICANT CHANGE UP (ref 0–0.2)
BASOPHILS NFR BLD AUTO: 0.3 % — SIGNIFICANT CHANGE UP (ref 0–2)
BILIRUB SERPL-MCNC: 0.4 MG/DL — SIGNIFICANT CHANGE UP (ref 0.2–1.2)
BUN SERPL-MCNC: 7 MG/DL — SIGNIFICANT CHANGE UP (ref 7–23)
CALCIUM SERPL-MCNC: 9.4 MG/DL — SIGNIFICANT CHANGE UP (ref 8.4–10.5)
CHLORIDE SERPL-SCNC: 103 MMOL/L — SIGNIFICANT CHANGE UP (ref 96–108)
CO2 SERPL-SCNC: 20 MMOL/L — LOW (ref 22–31)
CREAT SERPL-MCNC: 0.56 MG/DL — SIGNIFICANT CHANGE UP (ref 0.5–1.3)
EGFR: 129 ML/MIN/1.73M2 — SIGNIFICANT CHANGE UP
EOSINOPHIL # BLD AUTO: 0.04 K/UL — SIGNIFICANT CHANGE UP (ref 0–0.5)
EOSINOPHIL NFR BLD AUTO: 0.3 % — SIGNIFICANT CHANGE UP (ref 0–6)
GLUCOSE SERPL-MCNC: 105 MG/DL — HIGH (ref 70–99)
HCT VFR BLD CALC: 37.9 % — SIGNIFICANT CHANGE UP (ref 34.5–45)
HGB BLD-MCNC: 12.6 G/DL — SIGNIFICANT CHANGE UP (ref 11.5–15.5)
IMM GRANULOCYTES NFR BLD AUTO: 0.9 % — SIGNIFICANT CHANGE UP (ref 0–0.9)
LIDOCAIN IGE QN: 32 U/L — SIGNIFICANT CHANGE UP (ref 7–60)
LYMPHOCYTES # BLD AUTO: 0.81 K/UL — LOW (ref 1–3.3)
LYMPHOCYTES # BLD AUTO: 6.2 % — LOW (ref 13–44)
MCHC RBC-ENTMCNC: 31.7 PG — SIGNIFICANT CHANGE UP (ref 27–34)
MCHC RBC-ENTMCNC: 33.2 GM/DL — SIGNIFICANT CHANGE UP (ref 32–36)
MCV RBC AUTO: 95.5 FL — SIGNIFICANT CHANGE UP (ref 80–100)
MONOCYTES # BLD AUTO: 0.66 K/UL — SIGNIFICANT CHANGE UP (ref 0–0.9)
MONOCYTES NFR BLD AUTO: 5 % — SIGNIFICANT CHANGE UP (ref 2–14)
NEUTROPHILS # BLD AUTO: 11.41 K/UL — HIGH (ref 1.8–7.4)
NEUTROPHILS NFR BLD AUTO: 87.3 % — HIGH (ref 43–77)
NRBC # BLD: 0 /100 WBCS — SIGNIFICANT CHANGE UP (ref 0–0)
PLATELET # BLD AUTO: 188 K/UL — SIGNIFICANT CHANGE UP (ref 150–400)
POTASSIUM SERPL-MCNC: 4 MMOL/L — SIGNIFICANT CHANGE UP (ref 3.5–5.3)
POTASSIUM SERPL-SCNC: 4 MMOL/L — SIGNIFICANT CHANGE UP (ref 3.5–5.3)
PROT SERPL-MCNC: 7 G/DL — SIGNIFICANT CHANGE UP (ref 6–8.3)
RBC # BLD: 3.97 M/UL — SIGNIFICANT CHANGE UP (ref 3.8–5.2)
RBC # FLD: 13.7 % — SIGNIFICANT CHANGE UP (ref 10.3–14.5)
SODIUM SERPL-SCNC: 137 MMOL/L — SIGNIFICANT CHANGE UP (ref 135–145)
WBC # BLD: 13.08 K/UL — HIGH (ref 3.8–10.5)
WBC # FLD AUTO: 13.08 K/UL — HIGH (ref 3.8–10.5)

## 2023-04-26 PROCEDURE — 80053 COMPREHEN METABOLIC PANEL: CPT

## 2023-04-26 PROCEDURE — 82150 ASSAY OF AMYLASE: CPT

## 2023-04-26 PROCEDURE — 85025 COMPLETE CBC W/AUTO DIFF WBC: CPT

## 2023-04-26 PROCEDURE — 36415 COLL VENOUS BLD VENIPUNCTURE: CPT

## 2023-04-26 PROCEDURE — 99204 OFFICE O/P NEW MOD 45 MIN: CPT

## 2023-04-26 PROCEDURE — 96374 THER/PROPH/DIAG INJ IV PUSH: CPT

## 2023-04-26 PROCEDURE — 59025 FETAL NON-STRESS TEST: CPT

## 2023-04-26 PROCEDURE — G0463: CPT

## 2023-04-26 PROCEDURE — 96361 HYDRATE IV INFUSION ADD-ON: CPT

## 2023-04-26 PROCEDURE — 83690 ASSAY OF LIPASE: CPT

## 2023-04-26 RX ORDER — SODIUM CHLORIDE 9 MG/ML
1000 INJECTION, SOLUTION INTRAVENOUS
Refills: 0 | Status: DISCONTINUED | OUTPATIENT
Start: 2023-04-26 | End: 2023-05-10

## 2023-04-26 RX ORDER — SODIUM CHLORIDE 9 MG/ML
1000 INJECTION, SOLUTION INTRAVENOUS ONCE
Refills: 0 | Status: COMPLETED | OUTPATIENT
Start: 2023-04-26 | End: 2023-04-26

## 2023-04-26 RX ORDER — ONDANSETRON 8 MG/1
4 TABLET, FILM COATED ORAL ONCE
Refills: 0 | Status: COMPLETED | OUTPATIENT
Start: 2023-04-26 | End: 2023-04-26

## 2023-04-26 RX ADMIN — SODIUM CHLORIDE 1000 MILLILITER(S): 9 INJECTION, SOLUTION INTRAVENOUS at 05:22

## 2023-04-26 RX ADMIN — ONDANSETRON 4 MILLIGRAM(S): 8 TABLET, FILM COATED ORAL at 05:32

## 2023-04-26 NOTE — OB PROVIDER TRIAGE NOTE - NSPROVTRIAGESELHIDDEN_OBGYN_ALL_OB_FT
[NS_AttendInformed_OBGYN_ALL_OB:[id number]==] [NS_AttendInformed_OBGYN_ALL_OB:[id number]] [NS_AttendInformed_OBGYN_ALL_OB:[id number]==]

## 2023-04-26 NOTE — OB PROVIDER TRIAGE NOTE - NSOBPROVIDERNOTE_OBGYN_ALL_OB_FT
28yo  @39w3d presenting with nausea/vomiting/diarrhea, found to have uterine irritability on toco  - f/u CBC, CMP, Amylase, lipase  - IV fluids  - Katy Heller, PGY1  d/w Dr. Griffith 26yo  @39w3d presenting with nausea/vomiting/diarrhea, found to have uterine irritability on toco  - f/u CBC, CMP, Amylase, lipase  - IV fluids  - Katy Heller, PGY1  d/w Dr. Griffith

## 2023-04-26 NOTE — OB PROVIDER TRIAGE NOTE - HISTORY OF PRESENT ILLNESS
26yo  @39w3d p/f nausea/vomiting/diarrhea since 8pm last night after eating a spicy meal. She has also felt contractions every 15 min since 10pm. She has not been able to keep down any food or water. She has been waking up throughout the night to vomit. –VB, -LOF, +FM. Pt denies fever, chills, headache, constipation, dizziness, syncope, chest pain, palpitations, shortness of breath, dysuria, urgency, frequency.  PNC: unc  GBS: neg  EFW: 8#7  ObHx:   GynHx: -c/f/STI/abnl pap  MedHx: eczema   SrgHx: denies  PsychHx: denies  SocialHx: denies T/E/D  AllergyHx: denies  RxHx: PNV   28yo  @39w3d p/f nausea/vomiting/diarrhea since 8pm last night after eating a spicy meal. She has also felt contractions every 15 min since 10pm. She has not been able to keep down any food or water. She has been waking up throughout the night to vomit. –VB, -LOF, +FM. Pt denies fever, chills, headache, constipation, dizziness, syncope, chest pain, palpitations, shortness of breath, dysuria, urgency, frequency.  PNC: unc  GBS: neg  EFW: 8#7  ObHx:   GynHx: -c/f/STI/abnl pap  MedHx: eczema   SrgHx: denies  PsychHx: denies  SocialHx: denies T/E/D  AllergyHx: denies  RxHx: PNV

## 2023-04-27 ENCOUNTER — NON-APPOINTMENT (OUTPATIENT)
Age: 28
End: 2023-04-27

## 2023-04-27 DIAGNOSIS — O26.893 OTHER SPECIFIED PREGNANCY RELATED CONDITIONS, THIRD TRIMESTER: ICD-10-CM

## 2023-04-27 DIAGNOSIS — Z3A.39 39 WEEKS GESTATION OF PREGNANCY: ICD-10-CM

## 2023-04-27 DIAGNOSIS — O21.2 LATE VOMITING OF PREGNANCY: ICD-10-CM

## 2023-04-27 DIAGNOSIS — R19.7 DIARRHEA, UNSPECIFIED: ICD-10-CM

## 2023-04-27 DIAGNOSIS — O47.1 FALSE LABOR AT OR AFTER 37 COMPLETED WEEKS OF GESTATION: ICD-10-CM

## 2023-04-28 ENCOUNTER — APPOINTMENT (OUTPATIENT)
Dept: OBGYN | Facility: CLINIC | Age: 28
End: 2023-04-28
Payer: COMMERCIAL

## 2023-04-28 VITALS
WEIGHT: 179 LBS | DIASTOLIC BLOOD PRESSURE: 80 MMHG | BODY MASS INDEX: 28.77 KG/M2 | HEIGHT: 66 IN | SYSTOLIC BLOOD PRESSURE: 122 MMHG

## 2023-04-28 DIAGNOSIS — Z3A.39 39 WEEKS GESTATION OF PREGNANCY: ICD-10-CM

## 2023-04-28 DIAGNOSIS — Z34.93 ENCOUNTER FOR SUPERVISION OF NORMAL PREGNANCY, UNSPECIFIED, THIRD TRIMESTER: ICD-10-CM

## 2023-04-28 PROCEDURE — 0502F SUBSEQUENT PRENATAL CARE: CPT

## 2023-04-29 ENCOUNTER — TRANSCRIPTION ENCOUNTER (OUTPATIENT)
Age: 28
End: 2023-04-29

## 2023-04-29 ENCOUNTER — INPATIENT (INPATIENT)
Facility: HOSPITAL | Age: 28
LOS: 3 days | Discharge: HOME | End: 2023-05-03
Attending: OBSTETRICS & GYNECOLOGY | Admitting: OBSTETRICS & GYNECOLOGY
Payer: COMMERCIAL

## 2023-04-29 VITALS
SYSTOLIC BLOOD PRESSURE: 119 MMHG | OXYGEN SATURATION: 98 % | DIASTOLIC BLOOD PRESSURE: 64 MMHG | HEART RATE: 104 BPM | TEMPERATURE: 98 F | RESPIRATION RATE: 16 BRPM

## 2023-04-29 DIAGNOSIS — Z34.80 ENCOUNTER FOR SUPERVISION OF OTHER NORMAL PREGNANCY, UNSPECIFIED TRIMESTER: ICD-10-CM

## 2023-04-29 DIAGNOSIS — O26.899 OTHER SPECIFIED PREGNANCY RELATED CONDITIONS, UNSPECIFIED TRIMESTER: ICD-10-CM

## 2023-04-29 LAB
BASOPHILS # BLD AUTO: 0.03 K/UL — SIGNIFICANT CHANGE UP (ref 0–0.2)
BASOPHILS NFR BLD AUTO: 0.4 % — SIGNIFICANT CHANGE UP (ref 0–2)
BLD GP AB SCN SERPL QL: NEGATIVE — SIGNIFICANT CHANGE UP
EOSINOPHIL # BLD AUTO: 0.07 K/UL — SIGNIFICANT CHANGE UP (ref 0–0.5)
EOSINOPHIL NFR BLD AUTO: 0.8 % — SIGNIFICANT CHANGE UP (ref 0–6)
HCT VFR BLD CALC: 35.2 % — SIGNIFICANT CHANGE UP (ref 34.5–45)
HGB BLD-MCNC: 11.4 G/DL — LOW (ref 11.5–15.5)
IMM GRANULOCYTES NFR BLD AUTO: 0.8 % — SIGNIFICANT CHANGE UP (ref 0–0.9)
LYMPHOCYTES # BLD AUTO: 1.47 K/UL — SIGNIFICANT CHANGE UP (ref 1–3.3)
LYMPHOCYTES # BLD AUTO: 17.3 % — SIGNIFICANT CHANGE UP (ref 13–44)
MCHC RBC-ENTMCNC: 31 PG — SIGNIFICANT CHANGE UP (ref 27–34)
MCHC RBC-ENTMCNC: 32.4 GM/DL — SIGNIFICANT CHANGE UP (ref 32–36)
MCV RBC AUTO: 95.7 FL — SIGNIFICANT CHANGE UP (ref 80–100)
MONOCYTES # BLD AUTO: 0.72 K/UL — SIGNIFICANT CHANGE UP (ref 0–0.9)
MONOCYTES NFR BLD AUTO: 8.5 % — SIGNIFICANT CHANGE UP (ref 2–14)
NEUTROPHILS # BLD AUTO: 6.12 K/UL — SIGNIFICANT CHANGE UP (ref 1.8–7.4)
NEUTROPHILS NFR BLD AUTO: 72.2 % — SIGNIFICANT CHANGE UP (ref 43–77)
NRBC # BLD: 0 /100 WBCS — SIGNIFICANT CHANGE UP (ref 0–0)
PLATELET # BLD AUTO: 208 K/UL — SIGNIFICANT CHANGE UP (ref 150–400)
RBC # BLD: 3.68 M/UL — LOW (ref 3.8–5.2)
RBC # FLD: 13.7 % — SIGNIFICANT CHANGE UP (ref 10.3–14.5)
RH IG SCN BLD-IMP: POSITIVE — SIGNIFICANT CHANGE UP
WBC # BLD: 8.48 K/UL — SIGNIFICANT CHANGE UP (ref 3.8–10.5)
WBC # FLD AUTO: 8.48 K/UL — SIGNIFICANT CHANGE UP (ref 3.8–10.5)

## 2023-04-29 RX ORDER — SODIUM CHLORIDE 9 MG/ML
1000 INJECTION, SOLUTION INTRAVENOUS
Refills: 0 | Status: DISCONTINUED | OUTPATIENT
Start: 2023-04-29 | End: 2023-04-30

## 2023-04-29 RX ORDER — CITRIC ACID/SODIUM CITRATE 300-500 MG
15 SOLUTION, ORAL ORAL EVERY 6 HOURS
Refills: 0 | Status: DISCONTINUED | OUTPATIENT
Start: 2023-04-29 | End: 2023-04-30

## 2023-04-29 RX ORDER — OXYTOCIN 10 UNIT/ML
333.33 VIAL (ML) INJECTION
Qty: 20 | Refills: 0 | Status: DISCONTINUED | OUTPATIENT
Start: 2023-04-29 | End: 2023-05-03

## 2023-04-29 RX ORDER — CHLORHEXIDINE GLUCONATE 213 G/1000ML
1 SOLUTION TOPICAL ONCE
Refills: 0 | Status: DISCONTINUED | OUTPATIENT
Start: 2023-04-29 | End: 2023-04-30

## 2023-04-29 NOTE — OB RN PATIENT PROFILE - FUNCTIONAL ASSESSMENT - BASIC MOBILITY 6.
4-calculated by average/Not able to assess (calculate score using [facility] averaging method)  4-calculated by average/Not able to assess (calculate score using [facility] averaging method)  4-calculated by average/Not able to assess (calculate score using [facility] averaging method)

## 2023-04-29 NOTE — OB PROVIDER H&P - ASSESSMENT
no diarrhea
A&P:   Labor: admit to L&D  - expectant management  - routine labs  - EFM/toco  - clear liquids, IV hydration  Fetal: cat 1 tracing, fetal status reassuring  GBS: neg  Analgesia: epiPRN      Discussed with Dr. Saad Bealla PGY-1

## 2023-04-29 NOTE — OB PROVIDER H&P - NSHPPHYSICALEXAM_GEN_ALL_CORE
EFH: 135/mod/+accels, no decels  [place]: q2-5mins  VE: 1/90/-3  TAUS: vertex    BPP: 6/8, ESTELLE=4    General: comfortable, NAD  Abd: gravid, soft, nontender  Pulm: unlabored breathing EFH: 135/mod/+accels, no decels  [place]: q2-5mins  VE: 1/90/-3  TAUS: vertex    BPP: 6/8, ESTELLE=4    General: comfortable, NAD  Abd: gravid, soft, nontender  Pulm: unlabored breathing EFH: 135/mod/+accels, no decels  [place]: q2-5mins  VE: 1/90/-3  TAUS: vertex    BPP: 6/8, ESTELLE=4    General: comfortable, NAD  Abd: gravid, soft, nontender  Pulm: unlabored breathing

## 2023-04-29 NOTE — OB PROVIDER H&P - NSLOWPPHRISK_OBGYN_A_OB
No previous uterine incision/[place] Pregnancy/Less than or equal to 4 previous vaginal births/No known bleeding disorder/No history of postpartum hemorrhage/No other PPH risks indicated

## 2023-04-29 NOTE — OB RN PATIENT PROFILE - NSPROMEDSBROUGHTTOHOSP_GEN_A_NUR
Evaluated patient at bedside again. Right hip appears to have a benign exam. No drainage present. Pain reasonably well controlled considering recent surgery. No fluctuance noted superficial or deep. She does have dependent edema 2+ BLE and generalized swelling. Continue conservative care for right hip  I don't believe the hip is involved in her inflammatory condition    Evaluated lumbar spine MRI  Does appear to have a fluid collection right psoas muscle. Unable to determine whether infectious in etiology, but musculoskeletal infection suspicion remains low. This may account for numbness down RLE, or likely the broad-based disc herniations. Mild thigh numbness can also be expected after anterior hip replacement.     Continue medical care, no surgical intervention required  Following with you  Outpatient followup with me 1 week after discharged    Mónica Claros MD no

## 2023-04-29 NOTE — OB PROVIDER H&P - HISTORY OF PRESENT ILLNESS
R1 H&P    Pt is a 26y/o  at 39+6 admitted for labor and oligohydramnios. +FM, +ctx, -LOF, -VB  Prenatal course uncomplicated. She started feeling contractions this morning, and reports that they are now every 5-7 minutes and 7-8/10 pain. She denies any complications during this pregnancy. Today, she denies HA, vision changes, CP, SOB, N/V, RUQ pain, dysuria, diarrhea, fever, chills, cough, sore throat.  GBS negative  EFW 4000    OBHx:   GynHx: denies h/o abnormal paps, STI's, fibroids, cysts  PMHx: eczema, exertional asthma as a child  PSHx: denies  Med: PNV  All: NKDA  SH: denies alcohol, tobacco, or drug use  Psych: denies h/o anxiety or depression

## 2023-04-29 NOTE — OB RN PATIENT PROFILE - PRO PRENATAL LABS ORI SOURCE RUBELLA
Katia Spears 1957     Office/Outpatient Visit    Visit Date: Tue, Sep 4, 2018 08:34 am    Provider: Jennie Andre N.P. (Assistant: Bernie Dooley MA)    Location: [facility]        Electronically signed by Jennie Andre N.P. on  09/07/2018 11:54:27 AM                             SUBJECTIVE:        CC:     Mrs. Spears is a 61 year old White female.  Patient is here for yearly physical and medication refills.;         HPI:         Patient to be evaluated for health checkup.  Her last physical exam was 1 year ago.  She cannot recall the date of her last menstrual period.  She is not currently using any form of contraception.  She performs breast self-exams occasionally.    Her last mammogram was in 2018.   Her last DEXA was in 2018.   Preventative Health updated today.  Mrs. Spears denies any history of abnormal Pap smears.  Tobacco: She has a past history of cigarette smoking; quit date:  1996.          PHQ-9 Depression Screening: Completed form scanned and in chart; Total Score 4 Alcohol Consumption Screening: Completed form scanned and in chart; Total Score 1         Additionally, she presents with history of hyperlipidemia.  here for f/u and refills. States doing well on meds.          With regard to the hypertension, states bp has been elevated. She notices a headache with elevations. Brought log, see scanned.          Depression responsive to treatment details; pt states effexor helps but feels she needs to increase. She tries to keep herself busy with activities but knows that she sometimes takes on too much.          Fibromyalgia details; states doing well on meds. Needing refills.          JE: Pt states thinkg her deviated septum is causing her sleep apnea. She is suppose to have surgery but keeps putting it off. She will schedule when her  is better so he can be with her.      ROS:     CONSTITUTIONAL:  Negative for chills, fatigue, fever, and weight change.      EYES:   Negative for blurred vision.      CARDIOVASCULAR:  Negative for chest pain, orthopnea, paroxysmal nocturnal dyspnea and pedal edema.      RESPIRATORY:  Negative for dyspnea.      GASTROINTESTINAL:  Negative for abdominal pain, constipation, diarrhea, nausea and vomiting.      MUSCULOSKELETAL:  Negative for arthralgias, back pain, and myalgias.      NEUROLOGICAL:  Negative for dizziness, headaches, paresthesias, and weakness.      PSYCHIATRIC:  Negative for anxiety, depression, and sleep disturbances.          PMH/FMH/SH:     Last Reviewed on 2018 08:58 AM by Jennie Andre    Past Medical History:             GYNECOLOGICAL HISTORY:     miscarriage 4         CURRENT MEDICAL PROVIDERS:    Obstetrician/Gynecologist    Rheumatologist         PREVENTIVE HEALTH MAINTENANCE             BONE DENSITY: was last done 2018 with the following abnormality noted-- osteopenia and osteoprosis     COLORECTAL CANCER SCREENING: colonoscopy with normal results     EYE EXAM: was last done - Dr. Fisher     MAMMOGRAM: was last done  with normal results     PAP SMEAR: was last done  with normal results         Surgical History:         Cholecystectomy    : X 2;     Dilation and Curettage: multiple;     Procedures:    Colonoscopy ( --NEG;; )    LEEP procedure (  )    Treadmill stress test ( --NEG;; )         Family History:     Father:  at age 38; Cause of death was lung cancer     Mother:  at age 54; Cause of death was Shy-Draegers/Neurological Disorder     Brother(s): 3 brother(s) total;  Hyperlipidemia;  Schizophrenia     Sister(s): 5 sister(s) total;  Hyperlipidemia;  Type 2 Diabetes; thyroid disorder;  Rheumatoid Arthritis     Son(s): # sons total son(s) total;  Type 1 Diabetes     Positive for Coronary Artery Disease.          Social History:     Occupation: Homemaker     Marital Status:      Children: 2 children (ages 29/11 )         Tobacco/Alcohol/Supplements:     Last  Reviewed on 9/04/2018 08:58 AM by Jennie Andre    Tobacco: She has a past history of cigarette smoking; quit date:  1996.          Alcohol: Frequency: Socially     Caffeine:  She admits to consuming caffeine via coffee ( 1 serving per day ).          Substance Abuse History:     Last Reviewed on 9/04/2018 08:58 AM by Jennie Andre    None         Mental Health History:     Last Reviewed on 9/04/2018 08:58 AM by Jennie Andre        Communicable Diseases (eg STDs):     Last Reviewed on 9/04/2018 08:58 AM by Jennie Andre            Current Problems:     Last Reviewed on 9/04/2018 08:58 AM by Jennie Andre    Tobacco use disorder-1996     Hyperlipidemia     Vitamin D deficiency, unspecified     Chronic sinusitis, unspecified     Hypertension     Anxiety     Obstructive sleep apnea     Chronic venous insufficiency     Fibromyalgia     Generalized osteoarthritis, multiple sites     GERD     Chronic low back pain     Postmenopausal osteoporosis     Post-menopausal state     Depression responsive to treatment     Screening for depression     Shoulder pain         Immunizations:     Havrix -adult dose (HepA) 6/2/2018     Influenza, split virus pf (3+ years dose) 9/19/2017         Allergies:     Last Reviewed on 9/04/2018 08:58 AM by Jennie Andre      No Known Drug Allergies.         Current Medications:     Last Reviewed on 9/04/2018 08:58 AM by Jennie Andre    Baclofen 20mg Tablet Take 1 tablet(s) by mouth tid PRN     Hydrochlorothiazide (HCTZ) 25mg Tablet 1 tab daily     Neurontin 300mg Capsules One tablet PO TID PRN     Effexor XR 37.5mg Capsules, Extended Release Take 1 capsule(s) by mouth daily     Simvastatin 20mg Tablet 1 tab daily     Ibuprofen 800mg Tablet Take 1 tablet(s) by mouth q8h prn     OTC Multivitamin Take one tablet once daily prn         OBJECTIVE:        Vitals:         Current: 9/4/2018 8:38:57 AM    Ht:  5 ft, 1.5 in;  Wt: 174.8 lbs;  WC: 41 inches;  BMI: 32.5    T:  97.6 F (oral);  BP: 135/83 mm Hg (left arm, sitting);  P: 90 bpm (left arm (BP Cuff), sitting);  sCr: 0.82 mg/dL;  GFR: 75.14        Exams:     PHYSICAL EXAM:     GENERAL: vital signs recorded - well developed, well nourished;  no apparent distress;     EYES: extraocular movements intact; conjunctiva and cornea are normal; PERRLA;     E/N/T:  normal EACs, TMs, nasal/oral mucosa, teeth, gingiva, and oropharynx;     NECK: range of motion is normal; thyroid is non-palpable;     RESPIRATORY: normal respiratory rate and pattern with no distress; normal breath sounds with no rales, rhonchi, wheezes or rubs;     CARDIOVASCULAR: normal rate; rhythm is regular;  no systolic murmur; no edema;     GASTROINTESTINAL: nontender; normal bowel sounds; no organomegaly;     MUSCULOSKELETAL: low back tenderness.;     NEUROLOGICAL:  cranial nerves, motor and sensory function, reflexes, gait and coordination are all intact;     PSYCHIATRIC:  appropriate affect and demeanor; normal speech pattern; grossly normal memory;         Lab/Test Results:             Urine temperature:  confirmed (09/04/2018),     All urine drug screen levels confirmed negative:  yes (09/04/2018),     Date and time of last pill:  Gabapentin 9/4/18 @12AM (09/04/2018),     Performed by:  atc/tls (09/04/2018),     Collection Time:  931 (09/04/2018),             ASSESSMENT:           V70.0   Z00.00  Health checkup              DDx:     V79.0   Z13.89  Screening for depression              DDx:     272.4   E78.1  Hyperlipidemia              DDx:     401.1   I10  Hypertension              DDx:     300.4   F34.1  Depression with anxiety              DDx:     729.1   M79.7  Fibromyalgia              DDx:     327.23   G47.33  Obstructive sleep apnea              DDx:         ORDERS:         Meds Prescribed:       Lisinopril/Hydrochlorothiazide 10mg/12.5mg Tablet one a day  #30 (Thirty) tablet(s) Refills: 1       Refill of: Effexor XR (Venlafaxine HCl) 75mg Capsules,  Extended Release ONE AT HS  #90 (Ninety) capsule(s) Refills: 1       Refill of: Simvastatin 20mg Tablet 1 tab daily  #90 (Ninety) tablet(s) Refills: 1       Refill of: Baclofen 20mg Tablet Take 1 tablet(s) by mouth tid PRN  #60 (Sixty) tablet(s) Refills: 1       Refill of: Neurontin (Gabapentin) 300mg Capsules One tablet PO TID PRN  #60 (Sixty) capsule(s) Refills: 2         Lab Orders:       [id number]  Drug test prsmv qual dir optical obs per day  (In-House)         [id number]  [facility] CBC with 3 part diff  (Send-Out)         54297  COMP - [facility] Comp. Metabolic Panel  (Send-Out)         57342  RIBBA - [facility] Hepatitis C antibody with reflex  (Send-Out)         72006  LPDP - [facility] Lipid Panel  (Send-Out)         73732  TSH - [facility] TSH  (Send-Out)         APPTO  Appointment need  (In-House)           Other Orders:         Calculated BMI above the upper parameter and a follow-up plan was documented in the medical record  (In-House)                   PLAN: Pt is planning to have nasal surgery to help with sleep apnea.          Health checkup     LABORATORY:  Labs ordered to be performed today include CBC, Comprehensive metabolic panel, Hepatitis C antibody with reflex, lipid panel, and TSH.  [facility] PHQ-9 Depression Screening: Completed form scanned and in chart; Total Score 1     BMI Elevated - Follow-Up Plan: She was provided education on weight loss strategies     FOLLOW-UP: Schedule a follow-up visit in 6 months..   Chronic visit follow up           Orders:       [id number]  [facility] CBC with 3 part diff  (Send-Out)         68728  [facility] Comp. Metabolic Panel  (Send-Out)         49843  RIBBA - [facility] Hepatitis C antibody with reflex  (Send-Out)         27088  LPDP - [facility] Lipid Panel  (Send-Out)         16688  TSH - [facility] TSH  (Send-Out)           Calculated BMI above the upper parameter and a follow-up plan was documented in the medical record  (In-House)         APPTO  Appointment need  (In-House)            Hyperlipidemia            Prescriptions:       Refill of: Simvastatin 20mg Tablet 1 tab daily  #90 (Ninety) tablet(s) Refills: 1          Hypertension           Prescriptions:       Lisinopril/Hydrochlorothiazide 10mg/12.5mg Tablet one a day  #30 (Thirty) tablet(s) Refills: 1          Depression with anxiety          Prescriptions: Increase       Refill of: Effexor XR (Venlafaxine HCl) 75mg Capsules, Extended Release ONE AT HS  #90 (Ninety) capsule(s) Refills: 1          Fibromyalgia     LABORATORY:  Labs ordered to be performed today include Drug screen.            Prescriptions:       Refill of: Baclofen 20mg Tablet Take 1 tablet(s) by mouth tid PRN  #60 (Sixty) tablet(s) Refills: 1       Refill of: Neurontin (Gabapentin) 300mg Capsules One tablet PO TID PRN  #60 (Sixty) capsule(s) Refills: 2           Orders:       49702  Drug test prsmv qual dir optical obs per day  (In-House)               Patient Recommendations:        For  Health checkup:     Schedule a follow-up visit in 6 months.                APPOINTMENT INFORMATION:        Monday Tuesday Wednesday Thursday Friday Saturday Sunday            Time:___________________AM  PM   Date:_____________________             CHARGE CAPTURE:           Primary Diagnosis:     V70.0 Health checkup            Z00.00    Encounter for general adult medical examination without abnormal findings              Orders:          [id number]   Preventive medicine, established patient, age 40-64 years  (In-House)                Calculated BMI above the upper parameter and a follow-up plan was documented in the medical record  (In-House)             APPTO   Appointment need  (In-House)           V79.0 Screening for depression            Z13.89    Encounter for screening for other disorder              Orders:          74560 -25  Office/outpatient visit; established patient, level 3  (In-House)           272.4 Hyperlipidemia            E78.1    Pure hyperglyceridemia    401.1 Hypertension             I10    Essential (primary) hypertension    300.4 Depression with anxiety            F34.1    Dysthymic disorder    729.1 Fibromyalgia            M79.7    Fibromyalgia              Orders:          01884   Drug test prsmv qual dir optical obs per day  (In-House)           327.23 Obstructive sleep apnea            G47.33    Obstructive sleep apnea (adult) (pediatric)        ADDENDUMS:      ____________________________________    Addendum: 09/11/2018 01:35 PM - Connie Hadley         Visit Note Faxed to:        User Entered Recipient; Number [phone]            Addendum: 09/17/2018 10:19 AM - Connie Hadley         Visit Note Faxed to:        User Entered Recipient; Number [phone]                hard copy, drawn during this pregnancy

## 2023-04-29 NOTE — PRE-ANESTHESIA EVALUATION ADULT - BP NONINVASIVE SYSTOLIC (MM HG)
Incidental finding of b/l lung nodule on CT A/P, confirmed on CT chest  - Pulm agreed on IR biopsy. Less concern for septic emboli given neg BCx  - IR approved for lung biopsy, will do with spinal biopsy 134 78

## 2023-04-30 LAB
COVID-19 SPIKE DOMAIN AB INTERP: POSITIVE
COVID-19 SPIKE DOMAIN ANTIBODY RESULT: >250 U/ML — HIGH
HCT VFR BLD CALC: 29.1 % — LOW (ref 34.5–45)
HGB BLD-MCNC: 9.7 G/DL — LOW (ref 11.5–15.5)
SARS-COV-2 IGG+IGM SERPL QL IA: >250 U/ML — HIGH
SARS-COV-2 IGG+IGM SERPL QL IA: POSITIVE

## 2023-04-30 PROCEDURE — 59514 CESAREAN DELIVERY ONLY: CPT

## 2023-04-30 RX ORDER — NALOXONE HYDROCHLORIDE 4 MG/.1ML
0.1 SPRAY NASAL
Refills: 0 | Status: DISCONTINUED | OUTPATIENT
Start: 2023-04-30 | End: 2023-05-01

## 2023-04-30 RX ORDER — MORPHINE SULFATE 50 MG/1
2 CAPSULE, EXTENDED RELEASE ORAL ONCE
Refills: 0 | Status: DISCONTINUED | OUTPATIENT
Start: 2023-04-30 | End: 2023-05-01

## 2023-04-30 RX ORDER — KETOROLAC TROMETHAMINE 30 MG/ML
30 SYRINGE (ML) INJECTION EVERY 6 HOURS
Refills: 0 | Status: COMPLETED | OUTPATIENT
Start: 2023-04-30 | End: 2023-05-02

## 2023-04-30 RX ORDER — ACETAMINOPHEN 500 MG
975 TABLET ORAL
Refills: 0 | Status: DISCONTINUED | OUTPATIENT
Start: 2023-04-30 | End: 2023-05-03

## 2023-04-30 RX ORDER — SODIUM CHLORIDE 9 MG/ML
1000 INJECTION, SOLUTION INTRAVENOUS
Refills: 0 | Status: DISCONTINUED | OUTPATIENT
Start: 2023-04-30 | End: 2023-05-03

## 2023-04-30 RX ORDER — FERROUS SULFATE 325(65) MG
325 TABLET ORAL DAILY
Refills: 0 | Status: DISCONTINUED | OUTPATIENT
Start: 2023-04-30 | End: 2023-05-02

## 2023-04-30 RX ORDER — IBUPROFEN 200 MG
600 TABLET ORAL EVERY 6 HOURS
Refills: 0 | Status: COMPLETED | OUTPATIENT
Start: 2023-04-30 | End: 2024-03-28

## 2023-04-30 RX ORDER — LANOLIN
1 OINTMENT (GRAM) TOPICAL EVERY 6 HOURS
Refills: 0 | Status: DISCONTINUED | OUTPATIENT
Start: 2023-04-30 | End: 2023-05-03

## 2023-04-30 RX ORDER — SIMETHICONE 80 MG/1
80 TABLET, CHEWABLE ORAL EVERY 4 HOURS
Refills: 0 | Status: DISCONTINUED | OUTPATIENT
Start: 2023-04-30 | End: 2023-05-03

## 2023-04-30 RX ORDER — DIPHENHYDRAMINE HCL 50 MG
25 CAPSULE ORAL EVERY 6 HOURS
Refills: 0 | Status: DISCONTINUED | OUTPATIENT
Start: 2023-04-30 | End: 2023-05-03

## 2023-04-30 RX ORDER — OXYCODONE HYDROCHLORIDE 5 MG/1
5 TABLET ORAL
Refills: 0 | Status: DISCONTINUED | OUTPATIENT
Start: 2023-04-30 | End: 2023-05-03

## 2023-04-30 RX ORDER — ONDANSETRON 8 MG/1
4 TABLET, FILM COATED ORAL EVERY 6 HOURS
Refills: 0 | Status: DISCONTINUED | OUTPATIENT
Start: 2023-04-30 | End: 2023-05-01

## 2023-04-30 RX ORDER — OXYCODONE HYDROCHLORIDE 5 MG/1
5 TABLET ORAL ONCE
Refills: 0 | Status: DISCONTINUED | OUTPATIENT
Start: 2023-04-30 | End: 2023-05-03

## 2023-04-30 RX ORDER — FOLIC ACID 0.8 MG
1 TABLET ORAL DAILY
Refills: 0 | Status: DISCONTINUED | OUTPATIENT
Start: 2023-04-30 | End: 2023-05-01

## 2023-04-30 RX ORDER — OXYTOCIN 10 UNIT/ML
VIAL (ML) INJECTION
Qty: 30 | Refills: 0 | Status: DISCONTINUED | OUTPATIENT
Start: 2023-04-30 | End: 2023-04-30

## 2023-04-30 RX ORDER — SODIUM CHLORIDE 9 MG/ML
500 INJECTION, SOLUTION INTRAVENOUS ONCE
Refills: 0 | Status: COMPLETED | OUTPATIENT
Start: 2023-04-30 | End: 2023-04-30

## 2023-04-30 RX ORDER — OXYTOCIN 10 UNIT/ML
333.33 VIAL (ML) INJECTION
Qty: 20 | Refills: 0 | Status: DISCONTINUED | OUTPATIENT
Start: 2023-04-30 | End: 2023-05-03

## 2023-04-30 RX ORDER — ONDANSETRON 8 MG/1
4 TABLET, FILM COATED ORAL ONCE
Refills: 0 | Status: COMPLETED | OUTPATIENT
Start: 2023-04-30 | End: 2023-04-30

## 2023-04-30 RX ORDER — TETANUS TOXOID, REDUCED DIPHTHERIA TOXOID AND ACELLULAR PERTUSSIS VACCINE, ADSORBED 5; 2.5; 8; 8; 2.5 [IU]/.5ML; [IU]/.5ML; UG/.5ML; UG/.5ML; UG/.5ML
0.5 SUSPENSION INTRAMUSCULAR ONCE
Refills: 0 | Status: DISCONTINUED | OUTPATIENT
Start: 2023-04-30 | End: 2023-05-03

## 2023-04-30 RX ORDER — MAGNESIUM HYDROXIDE 400 MG/1
30 TABLET, CHEWABLE ORAL
Refills: 0 | Status: DISCONTINUED | OUTPATIENT
Start: 2023-04-30 | End: 2023-05-03

## 2023-04-30 RX ORDER — HEPARIN SODIUM 5000 [USP'U]/ML
5000 INJECTION INTRAVENOUS; SUBCUTANEOUS EVERY 12 HOURS
Refills: 0 | Status: DISCONTINUED | OUTPATIENT
Start: 2023-04-30 | End: 2023-05-03

## 2023-04-30 RX ORDER — DEXAMETHASONE 0.5 MG/5ML
4 ELIXIR ORAL EVERY 6 HOURS
Refills: 0 | Status: DISCONTINUED | OUTPATIENT
Start: 2023-04-30 | End: 2023-05-01

## 2023-04-30 RX ADMIN — Medication 30 MILLIGRAM(S): at 23:05

## 2023-04-30 RX ADMIN — Medication 2 MILLIUNIT(S)/MIN: at 03:40

## 2023-04-30 RX ADMIN — SODIUM CHLORIDE 1000 MILLILITER(S): 9 INJECTION, SOLUTION INTRAVENOUS at 14:29

## 2023-04-30 RX ADMIN — ONDANSETRON 4 MILLIGRAM(S): 8 TABLET, FILM COATED ORAL at 21:56

## 2023-04-30 RX ADMIN — ONDANSETRON 4 MILLIGRAM(S): 8 TABLET, FILM COATED ORAL at 14:08

## 2023-04-30 NOTE — OB PROVIDER LABOR PROGRESS NOTE - ASSESSMENT
@40 wks in active labor- 7cm  same exam for 6 hrs.    1. d/w pt r/b/a D/W PT of options of waiting.  Pt was changed positions and IUPC documenting adequate ctx and sono shows pt is OP position  2. pt and family agree toward moving to

## 2023-04-30 NOTE — OB PROVIDER LABOR PROGRESS NOTE - NS_OBIHIFHRDETAILS_OBGYN_ALL_OB_FT
140/mod/(-)accels/(-)decels
140/mod/(-)accels/(-)decels
140's  category 1
135/mod/+accels, no decels
150's category 1

## 2023-04-30 NOTE — OB PROVIDER DELIVERY SUMMARY - BABY A: DATE/TIME OF DELIVERY
30-Apr-2023 18:34 Prednisone Pregnancy And Lactation Text: This medication is Pregnancy Category C and it isn't know if it is safe during pregnancy. This medication is excreted in breast milk.

## 2023-04-30 NOTE — OB PROVIDER LABOR PROGRESS NOTE - ASSESSMENT
#Labor   - IUPC cable replaced and ultimately, IUPC was placed, yielding appropriate reading pattern. MVUs inadequate at time of documentation. Will titrate to adequacy  - Patient has been on Oxytocin since 0345 and was AROM'ed at 1037    #Fetal Wellbeing   - Cat 1    #Pain Control   - w/ Epi    Tyron Dent, PGY-1    d/w Dr. Graves

## 2023-04-30 NOTE — OB NEONATOLOGY/PEDIATRICIAN DELIVERY SUMMARY - NSPEDSNEONOTESA_OBGYN_ALL_OB_FT
Baby Dilcia is a 40 week gestation male born to a  26 year old female with EDC 23.   Maternal labs include blood type O+, Rub immune, RPR pending, Hep B[- ], GBS negative from , HIV negative. Maternal history is significant for asthma and eczema. Pregnancy was uncomplicated. Labor unremarkable, but proceeded with C/S delivery due to failure to progress. AROM at 10, approximately  _______ hrs. Complications during delivery include: (ie prolapsed cord, abruption). Resuscitation included: ___________ .  Apgars were: _______ . EOS score _______. Admit to __________ .  Temperature prior to transfer ______ C. Peds called to assess infant due to cyanosis at 5 minutes of life. Baby Dilcia is a 40 week gestation male born to a  26 year old female with EDC 23.   Maternal labs include blood type O+, Rub immune, RPR pending, Hep B[- ], GBS negative from , HIV negative. Maternal history is significant for asthma and eczema. Pregnancy was uncomplicated. Labor unremarkable, but proceeded with C/S delivery due to failure to progress. AROM at 10:37, approximately  7.5 hrs. Resuscitation included: CPAP started at 5 minutes of life for dusky coloring. Peds arrived at 5 minutes of life, continued CPAP+5, up to 40% then weaned to 21% to attain target SpO2. Trialed off CPAP after 10 minutes and infant maintaining saturations with normal work of breathing.  Apgars were: 8/9. EOS score 0.12. Admit to NBN.

## 2023-04-30 NOTE — OB PROVIDER DELIVERY SUMMARY - NSARRESTDETAILS_OBGYN_A_OB
No cervical change after at least 4 hours of adequate uterine activity (e.g. strong to palpation or Mvus > 200), or at least 6 hours of oxytocin administration with inadequate uterine activity

## 2023-04-30 NOTE — OB PROVIDER LABOR PROGRESS NOTE - NS_SUBJECTIVE/OBJECTIVE_OBGYN_ALL_OB_FT
pt comf  +epi
R1 OB Labor Note    S: Patient seen and examined at bedside.     T(C): 36.6 (04-29-23 @ 23:54), Max: 36.8 (04-29-23 @ 19:43)  HR: 107 (04-30-23 @ 01:09) (85 - 141)  BP: 102/60 (04-30-23 @ 01:09) (102/60 - 134/71)  RR: 18 (04-29-23 @ 23:54) (16 - 18)  SpO2: 100% (04-30-23 @ 01:09) (96% - 100%)
pt comf + epi
PGY1 Labor & Delivery Progress Note     Pt examined @ 1523 to assess IUPC    T(C): 36.9 (04-30-23 @ 13:39), Max: 37.0 (04-30-23 @ 01:31)  HR: 108 (04-30-23 @ 15:25) (85 - 143)  BP: 114/73 (04-30-23 @ 15:24) (97/54 - 134/71)  RR: 16 (04-30-23 @ 13:39) (14 - 18)  SpO2: 98% (04-30-23 @ 15:25) (92% - 100%)
PGY1 Labor & Delivery Progress Note     Pt examined @1314 to asses for cervical change     T(C): 36.6 (04-30-23 @ 11:25), Max: 37.0 (04-30-23 @ 01:31)  HR: 99 (04-30-23 @ 13:20) (85 - 143)  BP: 128/78 (04-30-23 @ 12:55) (97/54 - 134/71)  RR: 14 (04-30-23 @ 11:25) (14 - 18)  SpO2: 98% (04-30-23 @ 13:20) (92% - 100%)

## 2023-04-30 NOTE — OB RN DELIVERY SUMMARY - NSSELHIDDEN_OBGYN_ALL_OB_FT
[NS_DeliveryAttending1_OBGYN_ALL_OB_FT:[id number]=],[NS_DeliveryAssist1_OBGYN_ALL_OB_FT:[id number]=],[NS_DeliveryRN_OBGYN_ALL_OB_FT:[id number]] [NS_DeliveryAttending1_OBGYN_ALL_OB_FT:[id number]=],[NS_DeliveryAssist1_OBGYN_ALL_OB_FT:[id number]],[NS_DeliveryRN_OBGYN_ALL_OB_FT:[id number]] [NS_DeliveryAttending1_OBGYN_ALL_OB_FT:[id number]=],[NS_DeliveryAssist1_OBGYN_ALL_OB_FT:[id number]],[NS_DeliveryRN_OBGYN_ALL_OB_FT:[id number]]

## 2023-04-30 NOTE — CHART NOTE - NSCHARTNOTEFT_GEN_A_CORE
S: Pt checked s/p pLTCS for arrest of dilation EBL 1400 s/p IM pit, IM methergine, TXA. Patient is feeling well. Tolerating clears. She denies dizziness, SOB, chest pain, palpitations, nausea, vomiting. Mcdermott in place.    Vital Signs Last 24 Hrs  T(C): 36.7 (30 Apr 2023 21:35), Max: 37.7 (30 Apr 2023 18:25)  T(F): 98.1 (30 Apr 2023 21:35), Max: 99.9 (30 Apr 2023 18:25)  HR: 96 (30 Apr 2023 21:35) (89 - 143)  BP: 118/77 (30 Apr 2023 21:35) (97/54 - 134/71)  BP(mean): 85 (30 Apr 2023 20:25) (77 - 86)  RR: 18 (30 Apr 2023 21:35) (14 - 23)  SpO2: 97% (30 Apr 2023 21:35) (92% - 100%)    Parameters below as of 30 Apr 2023 21:35  Patient On (Oxygen Delivery Method): room air    PE:  Gen: well-appearing, NAD  Abd: soft, appropriately tender, fundus firm below umbilicus, incision c/d/i with Prineo in place  : mcdermott in place draining clear urine, minimal vaginal bleeding  Ext: LE nontender to palpation               11.4   8.48  )-----------( 208      ( 04-29 @ 21:33 )             35.2     A/P: 26yo POD#0 pLTCS for arrest of dilation EBL 1400 s/p IM pit, IM methergine, TXA. Patient was tachycardic in the PACU but it is decreasing, most recently 106. She is clinically stable  - f/u H/H at 10:30p  - Patient is cleared for transfer to PP floor  - Continue routine postoperative care    Blanca Heller, PGY1 S: Pt checked s/p pLTCS for arrest of dilation EBL 1400 s/p IM pit, IM methergine, TXA. Patient is feeling well. Tolerating clears. She denies dizziness, SOB, chest pain, palpitations, nausea, vomiting. Mcdermott in place.    Vital Signs Last 24 Hrs  T(C): 36.7 (30 Apr 2023 21:35), Max: 37.7 (30 Apr 2023 18:25)  T(F): 98.1 (30 Apr 2023 21:35), Max: 99.9 (30 Apr 2023 18:25)  HR: 96 (30 Apr 2023 21:35) (89 - 143)  BP: 118/77 (30 Apr 2023 21:35) (97/54 - 134/71)  BP(mean): 85 (30 Apr 2023 20:25) (77 - 86)  RR: 18 (30 Apr 2023 21:35) (14 - 23)  SpO2: 97% (30 Apr 2023 21:35) (92% - 100%)    Parameters below as of 30 Apr 2023 21:35  Patient On (Oxygen Delivery Method): room air    I&O's Summary    29 Apr 2023 07:01  -  30 Apr 2023 07:00  --------------------------------------------------------  IN: 0 mL / OUT: 750 mL / NET: -750 mL    30 Apr 2023 07:01  -  30 Apr 2023 22:24  --------------------------------------------------------  IN: 3403 mL / OUT: 2500 mL / NET: 903 mL        PE:  Gen: well-appearing, NAD  Abd: soft, appropriately tender, fundus firm below umbilicus, incision c/d/i with Prineo in place  : mcdermott in place draining clear urine, minimal vaginal bleeding  Ext: LE nontender to palpation               11.4   8.48  )-----------( 208      ( 04-29 @ 21:33 )             35.2     A/P: 28yo POD#0 pLTCS for arrest of dilation EBL 1400 s/p IM pit, IM methergine, TXA. Patient was tachycardic in the PACU but it is decreasing, most recently 106. UOP adequate. She is clinically stable  - f/u H/H at 10:30p  - Patient is cleared for transfer to PP floor  - Continue routine postoperative care    Blanca Heller, PGY1 S: Pt checked s/p pLTCS for arrest of dilation EBL 1400 s/p IM pit, IM methergine, TXA. Patient is feeling well. Tolerating clears. She denies dizziness, SOB, chest pain, palpitations, nausea, vomiting. Mcdermott in place.    Vital Signs Last 24 Hrs  T(C): 36.7 (30 Apr 2023 21:35), Max: 37.7 (30 Apr 2023 18:25)  T(F): 98.1 (30 Apr 2023 21:35), Max: 99.9 (30 Apr 2023 18:25)  HR: 96 (30 Apr 2023 21:35) (89 - 143)  BP: 118/77 (30 Apr 2023 21:35) (97/54 - 134/71)  BP(mean): 85 (30 Apr 2023 20:25) (77 - 86)  RR: 18 (30 Apr 2023 21:35) (14 - 23)  SpO2: 97% (30 Apr 2023 21:35) (92% - 100%)    Parameters below as of 30 Apr 2023 21:35  Patient On (Oxygen Delivery Method): room air    I&O's Summary    29 Apr 2023 07:01  -  30 Apr 2023 07:00  --------------------------------------------------------  IN: 0 mL / OUT: 750 mL / NET: -750 mL    30 Apr 2023 07:01  -  30 Apr 2023 22:24  --------------------------------------------------------  IN: 3403 mL / OUT: 2500 mL / NET: 903 mL        PE:  Gen: well-appearing, NAD  Abd: soft, appropriately tender, fundus firm below umbilicus, incision c/d/i with Prineo in place  : mcdermott in place draining clear urine, minimal vaginal bleeding  Ext: LE nontender to palpation               11.4   8.48  )-----------( 208      ( 04-29 @ 21:33 )             35.2     A/P: 26yo POD#0 pLTCS for arrest of dilation EBL 1400 s/p IM pit, IM methergine, TXA. Patient was tachycardic in the PACU but it is decreasing, most recently 106. UOP adequate. She is clinically stable  - f/u H/H at 10:30p  - Patient is cleared for transfer to PP floor  - Continue routine postoperative care    Blanca Heller, PGY1
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
## 2023-04-30 NOTE — OB PROVIDER LABOR PROGRESS NOTE - ASSESSMENT
A/P:   - Labor:  oligo. Being expectantly managed. Continue exp mgmt.  - Fetus: Cat 1  - GBS: negative  - Pain: epidural    Anita Yoder, PGY-1   A/P:   - Labor:  oligo. Being expectantly managed. Continue exp mgmt.  - Fetus: Cat 1  - GBS: negative  - Pain: epidural    Anita Yoder, PGY-1    Madhavi Graves M.D.

## 2023-04-30 NOTE — OB RN INTRAOPERATIVE NOTE - NSSELHIDDEN_OBGYN_ALL_OB_FT
[NS_DeliveryAttending1_OBGYN_ALL_OB_FT:[id number]=],[NS_DeliveryAssist1_OBGYN_ALL_OB_FT:[id number]=],[NS_DeliveryRN_OBGYN_ALL_OB_FT:[id number]] [NS_DeliveryAttending1_OBGYN_ALL_OB_FT:[id number]],[NS_DeliveryAssist1_OBGYN_ALL_OB_FT:[id number]],[NS_DeliveryRN_OBGYN_ALL_OB_FT:[id number]] [NS_DeliveryAttending1_OBGYN_ALL_OB_FT:[id number]=],[NS_DeliveryAssist1_OBGYN_ALL_OB_FT:[id number]],[NS_DeliveryRN_OBGYN_ALL_OB_FT:[id number]]

## 2023-04-30 NOTE — OB RN DELIVERY SUMMARY - NS_SEPSISRSKCALC_OBGYN_ALL_OB_FT
EOS calculated successfully. EOS Risk Factor: 0.5/1000 live births ([facility] national incidence); GA=40w;Temp=99.9; ROM=6.867; GBS='Negative'; Antibiotics='No antibiotics or any antibiotics < 2 hrs prior to birth'   EOS calculated successfully. EOS Risk Factor: 0.5/1000 live births ([facility] national incidence); GA=40w;Temp=99.9; ROM=6.867; GBS='Negative'; Antibiotics='No antibiotics or any antibiotics < 2 hrs prior to birth'   EOS calculated successfully. EOS Risk Factor: 0.5/1000 live births ([facility] national incidence); GA=40w;Temp=99.9; ROM=6.867; GBS='Negative'; Antibiotics='No antibiotics or any antibiotics < 2 hrs prior to birth'

## 2023-04-30 NOTE — OB PROVIDER DELIVERY SUMMARY - NSPROVIDERDELIVERYNOTE_OBGYN_ALL_OB_FT
primary LTCS, uc/b atony improved with TXA, methergine and extra pitocin administration  viable male infant, vertex presentation, Apgars 9/9, cord gasses sent  grossly normal fallopian tubes, uterus, and ovaries  uterus closed in 1 layer with vicryl    EBL: 1361  IVF: 1500  UOP: 100    Dictation #    RGris Tovar, PGY-2  w/ Dr. Graves primary LTCS, uc/b atony improved with TXA, methergine and extra pitocin administration, PIT IM  viable male infant, vertex presentation, Apgars 9/9, cord gasses sent  grossly normal fallopian tubes, uterus, and ovaries  uterus closed in 1 layer with vicryl    EBL: 1361  IVF: 1500  UOP: 100    Dictation #    HILLARY Tovar, PGY-2  w/ Dr. Graves primary LTCS, uc/b atony improved with TXA, methergine and extra pitocin administration, PIT IM  viable male infant, vertex presentation, Apgars 9/9, cord gasses sent  grossly normal fallopian tubes, uterus, and ovaries  uterus closed in 1 layer with vicryl    EBL: 1361  IVF: 1500  UOP: 100    Dictation #[id number]    HILLARY Tovar, PGY-2  w/ Dr. Graves primary LTCS, uc/b atony improved with TXA, methergine and extra pitocin administration, PIT IM  viable male infant, vertex presentation, Apgars 9/9, cord gasses sent  grossly normal fallopian tubes, uterus, and ovaries  uterus closed in 1 layer with vicryl    EBL: 1361  IVF: 1500  UOP: 100    Dictation #[id number]    HILLARY Tovar, PGY-2  w/ Dr. Graves primary LTCS, uc/b atony improved with TXA, methergine and extra pitocin administration, PIT IM  viable male infant, vertex presentation, Apgars 9/9, cord gasses sent  grossly normal fallopian tubes, uterus, and ovaries  uterus closed in 1 layer with vicryl    EBL: 1361  IVF: 1500  UOP: 100    Dictation #[id number]    HILLARY Tovar, PGY-2  w/ Dr. Graves

## 2023-04-30 NOTE — OB PROVIDER DELIVERY SUMMARY - NSSELHIDDEN_OBGYN_ALL_OB_FT
[NS_DeliveryAttending1_OBGYN_ALL_OB_FT:[id number]=],[NS_DeliveryAssist1_OBGYN_ALL_OB_FT:[id number]=] [NS_DeliveryAttending1_OBGYN_ALL_OB_FT:[id number]],[NS_DeliveryAssist1_OBGYN_ALL_OB_FT:[id number]] [NS_DeliveryAttending1_OBGYN_ALL_OB_FT:[id number]=],[NS_DeliveryAssist1_OBGYN_ALL_OB_FT:[id number]=]

## 2023-04-30 NOTE — OB PROVIDER LABOR PROGRESS NOTE - ASSESSMENT
A/P:   27y  @ 40.0wga admitted for IOL 2/2 to [place]     #Labor   - On Oxytocin. IUPC placed at 1321 after discussion with attending to assess for adequacy    #Fetal Wellbeing   - Cat 1    #Pain Control   - w/ Epi    Tyron Dent, PGY-1    d/w Dr. Graves

## 2023-04-30 NOTE — OB RN DELIVERY SUMMARY - NS_FETALMONITOR_OBGYN_ALL_OB
External [place]/Internal [place]/External [facility]HR External [place]/Internal [place]/External [facility]HR External [place]/Internal [place]/External [facility]HR

## 2023-05-01 ENCOUNTER — APPOINTMENT (OUTPATIENT)
Dept: OBGYN | Facility: CLINIC | Age: 28
End: 2023-05-01

## 2023-05-01 LAB
BASOPHILS # BLD AUTO: 0 K/UL — SIGNIFICANT CHANGE UP (ref 0–0.2)
BASOPHILS NFR BLD AUTO: 0 % — SIGNIFICANT CHANGE UP (ref 0–2)
EOSINOPHIL # BLD AUTO: 0 K/UL — SIGNIFICANT CHANGE UP (ref 0–0.5)
EOSINOPHIL NFR BLD AUTO: 0 % — SIGNIFICANT CHANGE UP (ref 0–6)
HCT VFR BLD CALC: 23.8 % — LOW (ref 34.5–45)
HGB BLD-MCNC: 7.8 G/DL — LOW (ref 11.5–15.5)
LYMPHOCYTES # BLD AUTO: 0.88 K/UL — LOW (ref 1–3.3)
LYMPHOCYTES # BLD AUTO: 5.3 % — LOW (ref 13–44)
MANUAL SMEAR VERIFICATION: SIGNIFICANT CHANGE UP
MCHC RBC-ENTMCNC: 32.1 PG — SIGNIFICANT CHANGE UP (ref 27–34)
MCHC RBC-ENTMCNC: 32.8 GM/DL — SIGNIFICANT CHANGE UP (ref 32–36)
MCV RBC AUTO: 97.9 FL — SIGNIFICANT CHANGE UP (ref 80–100)
MONOCYTES # BLD AUTO: 0.43 K/UL — SIGNIFICANT CHANGE UP (ref 0–0.9)
MONOCYTES NFR BLD AUTO: 2.6 % — SIGNIFICANT CHANGE UP (ref 2–14)
NEUTROPHILS # BLD AUTO: 15.21 K/UL — HIGH (ref 1.8–7.4)
NEUTROPHILS NFR BLD AUTO: 89.4 % — HIGH (ref 43–77)
NEUTS BAND # BLD: 2.7 % — SIGNIFICANT CHANGE UP (ref 0–8)
PLAT MORPH BLD: NORMAL — SIGNIFICANT CHANGE UP
PLATELET # BLD AUTO: 153 K/UL — SIGNIFICANT CHANGE UP (ref 150–400)
RBC # BLD: 2.43 M/UL — LOW (ref 3.8–5.2)
RBC # FLD: 13.5 % — SIGNIFICANT CHANGE UP (ref 10.3–14.5)
RBC BLD AUTO: SIGNIFICANT CHANGE UP
WBC # BLD: 16.52 K/UL — HIGH (ref 3.8–10.5)
WBC # FLD AUTO: 16.52 K/UL — HIGH (ref 3.8–10.5)

## 2023-05-01 RX ORDER — IBUPROFEN 200 MG
600 TABLET ORAL EVERY 6 HOURS
Refills: 0 | Status: DISCONTINUED | OUTPATIENT
Start: 2023-05-01 | End: 2023-05-03

## 2023-05-01 RX ORDER — SODIUM CHLORIDE 9 MG/ML
1000 INJECTION, SOLUTION INTRAVENOUS ONCE
Refills: 0 | Status: COMPLETED | OUTPATIENT
Start: 2023-05-01 | End: 2023-05-01

## 2023-05-01 RX ADMIN — Medication 975 MILLIGRAM(S): at 21:45

## 2023-05-01 RX ADMIN — Medication 975 MILLIGRAM(S): at 20:47

## 2023-05-01 RX ADMIN — Medication 975 MILLIGRAM(S): at 16:15

## 2023-05-01 RX ADMIN — Medication 325 MILLIGRAM(S): at 12:15

## 2023-05-01 RX ADMIN — HEPARIN SODIUM 5000 UNIT(S): 5000 INJECTION INTRAVENOUS; SUBCUTANEOUS at 15:16

## 2023-05-01 RX ADMIN — Medication 975 MILLIGRAM(S): at 08:37

## 2023-05-01 RX ADMIN — Medication 975 MILLIGRAM(S): at 15:16

## 2023-05-01 RX ADMIN — Medication 30 MILLIGRAM(S): at 12:19

## 2023-05-01 RX ADMIN — Medication 30 MILLIGRAM(S): at 05:29

## 2023-05-01 RX ADMIN — Medication 30 MILLIGRAM(S): at 19:00

## 2023-05-01 RX ADMIN — Medication 975 MILLIGRAM(S): at 09:30

## 2023-05-01 RX ADMIN — Medication 30 MILLIGRAM(S): at 18:14

## 2023-05-01 RX ADMIN — Medication 975 MILLIGRAM(S): at 04:40

## 2023-05-01 RX ADMIN — Medication 600 MILLIGRAM(S): at 23:52

## 2023-05-01 RX ADMIN — HEPARIN SODIUM 5000 UNIT(S): 5000 INJECTION INTRAVENOUS; SUBCUTANEOUS at 03:01

## 2023-05-01 RX ADMIN — Medication 1 TABLET(S): at 12:16

## 2023-05-01 RX ADMIN — SODIUM CHLORIDE 1000 MILLILITER(S): 9 INJECTION, SOLUTION INTRAVENOUS at 03:16

## 2023-05-01 RX ADMIN — Medication 30 MILLIGRAM(S): at 13:15

## 2023-05-01 RX ADMIN — Medication 30 MILLIGRAM(S): at 18:54

## 2023-05-01 RX ADMIN — Medication 975 MILLIGRAM(S): at 03:17

## 2023-05-01 NOTE — PROGRESS NOTE ADULT - SUBJECTIVE AND OBJECTIVE BOX
Day 1 of Anesthesia Pain Management Service    SUBJECTIVE: Doing ok  Pain Scale Score:          [X] Refer to charted pain scores    THERAPY:  s/p   2  mg PF epidural morphine on 4\30\2023      MEDICATIONS  (STANDING):  acetaminophen     Tablet .. 975 milliGRAM(s) Oral <User Schedule>  diphtheria/tetanus/pertussis (acellular) Vaccine (Adacel) 0.5 milliLiter(s) IntraMuscular once  ferrous    sulfate 325 milliGRAM(s) Oral daily  folic acid 1 milliGRAM(s) Oral daily  heparin   Injectable 5000 Unit(s) SubCutaneous every 12 hours  ibuprofen  Tablet. 600 milliGRAM(s) Oral every 6 hours  ketorolac   Injectable 30 milliGRAM(s) IV Push every 6 hours  lactated ringers. 1000 milliLiter(s) (125 mL/Hr) IV Continuous <Continuous>  morphine PF Epidural 2 milliGRAM(s) Epidural once  oxytocin Infusion 333.333 milliUNIT(s)/Min (1000 mL/Hr) IV Continuous <Continuous>  oxytocin Infusion 333.333 milliUNIT(s)/Min (1000 mL/Hr) IV Continuous <Continuous>  prenatal multivitamin 1 Tablet(s) Oral daily    MEDICATIONS  (PRN):  dexAMETHasone  Injectable 4 milliGRAM(s) IV Push every 6 hours PRN Nausea  diphenhydrAMINE 25 milliGRAM(s) Oral every 6 hours PRN Pruritus  lanolin Ointment 1 Application(s) Topical every 6 hours PRN Sore Nipples  magnesium hydroxide Suspension 30 milliLiter(s) Oral two times a day PRN Constipation  naloxone Injectable 0.1 milliGRAM(s) IV Push every 3 minutes PRN For ANY of the following changes in patient status:  A. Breaths Per Minute LESS THAN 10, B. Oxygen saturation LESS THAN 90%, C. Sedation score of 6 for Stop After: 4 Times  ondansetron Injectable 4 milliGRAM(s) IV Push every 6 hours PRN Nausea  oxyCODONE    IR 5 milliGRAM(s) Oral every 3 hours PRN Moderate to Severe Pain (4-10)  oxyCODONE    IR 5 milliGRAM(s) Oral once PRN Moderate to Severe Pain (4-10)  simethicone 80 milliGRAM(s) Chew every 4 hours PRN Gas      OBJECTIVE:    Sedation:        	[X] Alert	 [ ] Drowsy	[ ] Arousable      [ ] Asleep       [ ] Unresponsive    Side Effects:	[X] None 	[ ] Nausea	[ ] Vomiting         [ ] Pruritus  		[ ] Weakness            [ ] Numbness	          [ ] Other:    Vital Signs Last 24 Hrs  T(C): 36.7 (01 May 2023 05:18), Max: 37.7 (30 Apr 2023 18:25)  T(F): 98.1 (01 May 2023 05:18), Max: 99.9 (30 Apr 2023 18:25)  HR: 97 (01 May 2023 05:18) (94 - 143)  BP: 97/63 (01 May 2023 05:18) (97/63 - 132/83)  BP(mean): 85 (30 Apr 2023 20:25) (77 - 86)  RR: 18 (01 May 2023 05:18) (14 - 23)  SpO2: 98% (01 May 2023 05:18) (92% - 100%)    Parameters below as of 01 May 2023 05:18  Patient On (Oxygen Delivery Method): room air        ASSESSMENT/ PLAN  [X] Patient to be transitioned to prn analgesics after 24 hours  [X] Pain management per primary service, pain service to sign off   [X]Documentation and Verification of current medications

## 2023-05-02 LAB
ANISOCYTOSIS BLD QL: SLIGHT — SIGNIFICANT CHANGE UP
BASOPHILS # BLD AUTO: 0 K/UL — SIGNIFICANT CHANGE UP (ref 0–0.2)
BASOPHILS NFR BLD AUTO: 0 % — SIGNIFICANT CHANGE UP (ref 0–2)
DACRYOCYTES BLD QL SMEAR: SLIGHT — SIGNIFICANT CHANGE UP
EOSINOPHIL # BLD AUTO: 0 K/UL — SIGNIFICANT CHANGE UP (ref 0–0.5)
EOSINOPHIL NFR BLD AUTO: 0 % — SIGNIFICANT CHANGE UP (ref 0–6)
HCT VFR BLD CALC: 21.8 % — LOW (ref 34.5–45)
HCT VFR BLD CALC: 23.5 % — LOW (ref 34.5–45)
HGB BLD-MCNC: 7 G/DL — CRITICAL LOW (ref 11.5–15.5)
HGB BLD-MCNC: 7.9 G/DL — LOW (ref 11.5–15.5)
HYPOCHROMIA BLD QL: SLIGHT — SIGNIFICANT CHANGE UP
LYMPHOCYTES # BLD AUTO: 1.01 K/UL — SIGNIFICANT CHANGE UP (ref 1–3.3)
LYMPHOCYTES # BLD AUTO: 8.8 % — LOW (ref 13–44)
MACROCYTES BLD QL: SLIGHT — SIGNIFICANT CHANGE UP
MANUAL SMEAR VERIFICATION: SIGNIFICANT CHANGE UP
MCHC RBC-ENTMCNC: 32.1 GM/DL — SIGNIFICANT CHANGE UP (ref 32–36)
MCHC RBC-ENTMCNC: 32.1 PG — SIGNIFICANT CHANGE UP (ref 27–34)
MCHC RBC-ENTMCNC: 32.5 PG — SIGNIFICANT CHANGE UP (ref 27–34)
MCHC RBC-ENTMCNC: 33.6 GM/DL — SIGNIFICANT CHANGE UP (ref 32–36)
MCV RBC AUTO: 100 FL — SIGNIFICANT CHANGE UP (ref 80–100)
MCV RBC AUTO: 96.7 FL — SIGNIFICANT CHANGE UP (ref 80–100)
MONOCYTES # BLD AUTO: 0.4 K/UL — SIGNIFICANT CHANGE UP (ref 0–0.9)
MONOCYTES NFR BLD AUTO: 3.5 % — SIGNIFICANT CHANGE UP (ref 2–14)
NEUTROPHILS # BLD AUTO: 10.09 K/UL — HIGH (ref 1.8–7.4)
NEUTROPHILS NFR BLD AUTO: 87.7 % — HIGH (ref 43–77)
NRBC # BLD: 0 /100 WBCS — SIGNIFICANT CHANGE UP (ref 0–0)
OVALOCYTES BLD QL SMEAR: SLIGHT — SIGNIFICANT CHANGE UP
PLAT MORPH BLD: NORMAL — SIGNIFICANT CHANGE UP
PLATELET # BLD AUTO: 154 K/UL — SIGNIFICANT CHANGE UP (ref 150–400)
PLATELET # BLD AUTO: 218 K/UL — SIGNIFICANT CHANGE UP (ref 150–400)
POIKILOCYTOSIS BLD QL AUTO: SLIGHT — SIGNIFICANT CHANGE UP
RBC # BLD: 2.18 M/UL — LOW (ref 3.8–5.2)
RBC # BLD: 2.43 M/UL — LOW (ref 3.8–5.2)
RBC # FLD: 13.6 % — SIGNIFICANT CHANGE UP (ref 10.3–14.5)
RBC # FLD: 13.7 % — SIGNIFICANT CHANGE UP (ref 10.3–14.5)
RBC BLD AUTO: ABNORMAL
T PALLIDUM AB TITR SER: NEGATIVE — SIGNIFICANT CHANGE UP
WBC # BLD: 11.51 K/UL — HIGH (ref 3.8–10.5)
WBC # BLD: 13.44 K/UL — HIGH (ref 3.8–10.5)
WBC # FLD AUTO: 11.51 K/UL — HIGH (ref 3.8–10.5)
WBC # FLD AUTO: 13.44 K/UL — HIGH (ref 3.8–10.5)

## 2023-05-02 RX ORDER — FERROUS SULFATE 325(65) MG
325 TABLET ORAL THREE TIMES A DAY
Refills: 0 | Status: DISCONTINUED | OUTPATIENT
Start: 2023-05-02 | End: 2023-05-03

## 2023-05-02 RX ORDER — ASCORBIC ACID 60 MG
500 TABLET,CHEWABLE ORAL THREE TIMES A DAY
Refills: 0 | Status: DISCONTINUED | OUTPATIENT
Start: 2023-05-02 | End: 2023-05-03

## 2023-05-02 RX ADMIN — Medication 325 MILLIGRAM(S): at 14:50

## 2023-05-02 RX ADMIN — Medication 500 MILLIGRAM(S): at 22:07

## 2023-05-02 RX ADMIN — Medication 975 MILLIGRAM(S): at 10:30

## 2023-05-02 RX ADMIN — Medication 600 MILLIGRAM(S): at 13:00

## 2023-05-02 RX ADMIN — Medication 1 TABLET(S): at 11:58

## 2023-05-02 RX ADMIN — Medication 975 MILLIGRAM(S): at 21:45

## 2023-05-02 RX ADMIN — Medication 975 MILLIGRAM(S): at 14:49

## 2023-05-02 RX ADMIN — Medication 600 MILLIGRAM(S): at 11:59

## 2023-05-02 RX ADMIN — Medication 975 MILLIGRAM(S): at 02:27

## 2023-05-02 RX ADMIN — HEPARIN SODIUM 5000 UNIT(S): 5000 INJECTION INTRAVENOUS; SUBCUTANEOUS at 06:18

## 2023-05-02 RX ADMIN — Medication 500 MILLIGRAM(S): at 14:49

## 2023-05-02 RX ADMIN — Medication 325 MILLIGRAM(S): at 22:07

## 2023-05-02 RX ADMIN — Medication 600 MILLIGRAM(S): at 23:55

## 2023-05-02 RX ADMIN — Medication 600 MILLIGRAM(S): at 06:18

## 2023-05-02 RX ADMIN — Medication 600 MILLIGRAM(S): at 18:00

## 2023-05-02 RX ADMIN — Medication 975 MILLIGRAM(S): at 20:49

## 2023-05-02 RX ADMIN — Medication 600 MILLIGRAM(S): at 00:20

## 2023-05-02 RX ADMIN — Medication 600 MILLIGRAM(S): at 06:50

## 2023-05-02 RX ADMIN — Medication 975 MILLIGRAM(S): at 09:30

## 2023-05-02 RX ADMIN — Medication 975 MILLIGRAM(S): at 15:45

## 2023-05-02 NOTE — PROGRESS NOTE ADULT - ASSESSMENT
PODD #2 doing well  Repeat CBC to ensure stable H/H  Regular diet  PO analgesia  ambulation encouraged  Iron, Vit C    Michelle Griffith MD

## 2023-05-02 NOTE — PROGRESS NOTE ADULT - SUBJECTIVE AND OBJECTIVE BOX
EDITH MCKEON  MRN-[id number]  27y    Subjective:  She feels well.  She is ambulating.  Tolerating diet.  Lochia wnl. Pain is well controlled,  She denies dizziness, CP, SOB.   Vital Signs Last 24 Hrs  T(C): 36.7 (02 May 2023 06:20), Max: 36.7 (01 May 2023 21:09)  T(F): 98 (02 May 2023 06:20), Max: 98.1 (01 May 2023 21:09)  HR: 103 (02 May 2023 06:20) (103 - 109)  BP: 113/70 (02 May 2023 06:20) (95/64 - 113/70)  BP(mean): --  RR: 16 (02 May 2023 06:20) (16 - 18)  SpO2: 100% (02 May 2023 06:20) (98% - 100%)    Parameters below as of 01 May 2023 21:09  Patient On (Oxygen Delivery Method): room air        I&O's Summary    01 May 2023 07:01  -  02 May 2023 07:00  --------------------------------------------------------  IN: 0 mL / OUT: 1950 mL / NET: -1950 mL                              7.0    11.51 )-----------( 154      ( 02 May 2023 06:45 )             21.8       Allergies    No Known Allergies    Intolerances        MEDICATIONS  (STANDING):  acetaminophen     Tablet .. 975 milliGRAM(s) Oral <User Schedule>  ascorbic acid 500 milliGRAM(s) Oral three times a day  diphtheria/tetanus/pertussis (acellular) Vaccine (Adacel) 0.5 milliLiter(s) IntraMuscular once  ferrous    sulfate 325 milliGRAM(s) Oral three times a day  heparin   Injectable 5000 Unit(s) SubCutaneous every 12 hours  ibuprofen  Tablet. 600 milliGRAM(s) Oral every 6 hours  ketorolac   Injectable 30 milliGRAM(s) IV Push every 6 hours  lactated ringers. 1000 milliLiter(s) (125 mL/Hr) IV Continuous <Continuous>  oxytocin Infusion 333.333 milliUNIT(s)/Min (1000 mL/Hr) IV Continuous <Continuous>  oxytocin Infusion 333.333 milliUNIT(s)/Min (1000 mL/Hr) IV Continuous <Continuous>  prenatal multivitamin 1 Tablet(s) Oral daily    MEDICATIONS  (PRN):  diphenhydrAMINE 25 milliGRAM(s) Oral every 6 hours PRN Pruritus  lanolin Ointment 1 Application(s) Topical every 6 hours PRN Sore Nipples  magnesium hydroxide Suspension 30 milliLiter(s) Oral two times a day PRN Constipation  oxyCODONE    IR 5 milliGRAM(s) Oral every 3 hours PRN Moderate to Severe Pain (4-10)  oxyCODONE    IR 5 milliGRAM(s) Oral once PRN Moderate to Severe Pain (4-10)  simethicone 80 milliGRAM(s) Chew every 4 hours PRN Gas      PHYSICAL EXAM:    Abdomen: soft, nontender, fundus firm, incision clean, dry and intact  Ext:NTBL, Mild edema  Pelvis: deferred                 EDITH MCKEON  [id number]  27y    Subjective:  She feels well.  She is ambulating.  Tolerating diet.  Lochia wnl. Pain is well controlled,  She denies dizziness, CP, SOB.   Vital Signs Last 24 Hrs  T(C): 36.7 (02 May 2023 06:20), Max: 36.7 (01 May 2023 21:09)  T(F): 98 (02 May 2023 06:20), Max: 98.1 (01 May 2023 21:09)  HR: 103 (02 May 2023 06:20) (103 - 109)  BP: 113/70 (02 May 2023 06:20) (95/64 - 113/70)  BP(mean): --  RR: 16 (02 May 2023 06:20) (16 - 18)  SpO2: 100% (02 May 2023 06:20) (98% - 100%)    Parameters below as of 01 May 2023 21:09  Patient On (Oxygen Delivery Method): room air        I&O's Summary    01 May 2023 07:01  -  02 May 2023 07:00  --------------------------------------------------------  IN: 0 mL / OUT: 1950 mL / NET: -1950 mL                              7.0    11.51 )-----------( 154      ( 02 May 2023 06:45 )             21.8       Allergies    No Known Allergies    Intolerances        MEDICATIONS  (STANDING):  acetaminophen     Tablet .. 975 milliGRAM(s) Oral <User Schedule>  ascorbic acid 500 milliGRAM(s) Oral three times a day  diphtheria/tetanus/pertussis (acellular) Vaccine (Adacel) 0.5 milliLiter(s) IntraMuscular once  ferrous    sulfate 325 milliGRAM(s) Oral three times a day  heparin   Injectable 5000 Unit(s) SubCutaneous every 12 hours  ibuprofen  Tablet. 600 milliGRAM(s) Oral every 6 hours  ketorolac   Injectable 30 milliGRAM(s) IV Push every 6 hours  lactated ringers. 1000 milliLiter(s) (125 mL/Hr) IV Continuous <Continuous>  oxytocin Infusion 333.333 milliUNIT(s)/Min (1000 mL/Hr) IV Continuous <Continuous>  oxytocin Infusion 333.333 milliUNIT(s)/Min (1000 mL/Hr) IV Continuous <Continuous>  prenatal multivitamin 1 Tablet(s) Oral daily    MEDICATIONS  (PRN):  diphenhydrAMINE 25 milliGRAM(s) Oral every 6 hours PRN Pruritus  lanolin Ointment 1 Application(s) Topical every 6 hours PRN Sore Nipples  magnesium hydroxide Suspension 30 milliLiter(s) Oral two times a day PRN Constipation  oxyCODONE    IR 5 milliGRAM(s) Oral every 3 hours PRN Moderate to Severe Pain (4-10)  oxyCODONE    IR 5 milliGRAM(s) Oral once PRN Moderate to Severe Pain (4-10)  simethicone 80 milliGRAM(s) Chew every 4 hours PRN Gas      PHYSICAL EXAM:    Abdomen: soft, nontender, fundus firm, incision clean, dry and intact  Ext:NTBL, Mild edema  Pelvis: deferred                 EDITH MCKEON  [id number]  27y    Subjective:  She feels well.  She is ambulating.  Tolerating diet.  Lochia wnl. Pain is well controlled,  She denies dizziness, CP, SOB.   Vital Signs Last 24 Hrs  T(C): 36.7 (02 May 2023 06:20), Max: 36.7 (01 May 2023 21:09)  T(F): 98 (02 May 2023 06:20), Max: 98.1 (01 May 2023 21:09)  HR: 103 (02 May 2023 06:20) (103 - 109)  BP: 113/70 (02 May 2023 06:20) (95/64 - 113/70)  BP(mean): --  RR: 16 (02 May 2023 06:20) (16 - 18)  SpO2: 100% (02 May 2023 06:20) (98% - 100%)    Parameters below as of 01 May 2023 21:09  Patient On (Oxygen Delivery Method): room air        I&O's Summary    01 May 2023 07:01  -  02 May 2023 07:00  --------------------------------------------------------  IN: 0 mL / OUT: 1950 mL / NET: -1950 mL                              7.0    11.51 )-----------( 154      ( 02 May 2023 06:45 )             21.8       Allergies    No Known Allergies    Intolerances        MEDICATIONS  (STANDING):  acetaminophen     Tablet .. 975 milliGRAM(s) Oral <User Schedule>  ascorbic acid 500 milliGRAM(s) Oral three times a day  diphtheria/tetanus/pertussis (acellular) Vaccine (Adacel) 0.5 milliLiter(s) IntraMuscular once  ferrous    sulfate 325 milliGRAM(s) Oral three times a day  heparin   Injectable 5000 Unit(s) SubCutaneous every 12 hours  ibuprofen  Tablet. 600 milliGRAM(s) Oral every 6 hours  ketorolac   Injectable 30 milliGRAM(s) IV Push every 6 hours  lactated ringers. 1000 milliLiter(s) (125 mL/Hr) IV Continuous <Continuous>  oxytocin Infusion 333.333 milliUNIT(s)/Min (1000 mL/Hr) IV Continuous <Continuous>  oxytocin Infusion 333.333 milliUNIT(s)/Min (1000 mL/Hr) IV Continuous <Continuous>  prenatal multivitamin 1 Tablet(s) Oral daily    MEDICATIONS  (PRN):  diphenhydrAMINE 25 milliGRAM(s) Oral every 6 hours PRN Pruritus  lanolin Ointment 1 Application(s) Topical every 6 hours PRN Sore Nipples  magnesium hydroxide Suspension 30 milliLiter(s) Oral two times a day PRN Constipation  oxyCODONE    IR 5 milliGRAM(s) Oral every 3 hours PRN Moderate to Severe Pain (4-10)  oxyCODONE    IR 5 milliGRAM(s) Oral once PRN Moderate to Severe Pain (4-10)  simethicone 80 milliGRAM(s) Chew every 4 hours PRN Gas      PHYSICAL EXAM:    Abdomen: soft, nontender, fundus firm, incision clean, dry and intact  Ext:NTBL, Mild edema  Pelvis: deferred

## 2023-05-02 NOTE — PROGRESS NOTE ADULT - SUBJECTIVE AND OBJECTIVE BOX
Postpartum Note -  Section POD#1    Allergies: NKDA/NKFA/NKEA    Blood Type: O+  Rubella ?  RPR ?    Patient w/o complaints. States to be exhibiting mild pain today post-op. Experiences stretching pain when ambulating. Pt is OOB, ambulating w/o complaints, passing flatus and has minimal bleeding post-op. Denies CP/SOB. Lochia WNL, sleeping well.    O:   ICU Vital Signs Last 24 Hrs  T(C): 36.7 (02 May 2023 06:20), Max: 36.8 (01 May 2023 09:26)  T(F): 98 (02 May 2023 06:20), Max: 98.2 (01 May 2023 09:26)  HR: 103 (02 May 2023 06:20) (91 - 109)  BP: 113/70 (02 May 2023 06:20) (95/64 - 116/74)  BP(mean): --  ABP: --  ABP(mean): --  RR: 16 (02 May 2023 06:20) (16 - 18)  SpO2: 100% (02 May 2023 06:20) (98% - 100%)    O2 Parameters below as of 01 May 2023 21:09  Patient On (Oxygen Delivery Method): room air      I&O's Summary    01 May 2023 07:01  -  02 May 2023 07:00  --------------------------------------------------------  IN: 0 mL / OUT: 1950 mL / NET: -1950 mL      Gen: NAD  Heart: normal S1S2, RRR  Lungs: CTA b/l. No wheezing, rhonci, crackles  Abdomen: Soft, nontender, nondistended, BS x4Q, fundus firm  Incision: Clean, dry and intact. Negative erythema/edema/ecchymosis  Ext: Negative homans sign b/l. eExtremities warm to touch    Labs: pending      PAST MEDICAL & SURGICAL HISTORY:  Asthma      Eczema      No significant past surgical history        Current Issues: None   Postpartum Note -  Section POD#1    Allergies: NKDA/NKFA/NKEA    Blood Type: O+  Rubella ?  RPR negative    Patient w/o complaints. States to be exhibiting mild pain today post-op. Experiences stretching pain when ambulating. Pt is OOB, ambulating w/o complaints, passing flatus and has minimal bleeding post-op. Denies CP/SOB. Lochia WNL, sleeping well.    O:   ICU Vital Signs Last 24 Hrs  T(C): 36.7 (02 May 2023 06:20), Max: 36.8 (01 May 2023 09:26)  T(F): 98 (02 May 2023 06:20), Max: 98.2 (01 May 2023 09:26)  HR: 103 (02 May 2023 06:20) (91 - 109)  BP: 113/70 (02 May 2023 06:20) (95/64 - 116/74)  BP(mean): --  ABP: --  ABP(mean): --  RR: 16 (02 May 2023 06:20) (16 - 18)  SpO2: 100% (02 May 2023 06:20) (98% - 100%)    O2 Parameters below as of 01 May 2023 21:09  Patient On (Oxygen Delivery Method): room air      I&O's Summary    01 May 2023 07:01  -  02 May 2023 07:00  --------------------------------------------------------  IN: 0 mL / OUT: 1950 mL / NET: -1950 mL      Gen: NAD  Heart: normal S1S2, RRR  Lungs: CTA b/l. No wheezing, rhonchi crackles  Abdomen: Soft, nontender, nondistended, BS x4Q, fundus firm  Incision: Clean, dry and intact. Negative erythema/edema/ecchymosis  Ext: Negative homans sign b/l. Extremities warm to touch    Labs: pending      PAST MEDICAL & SURGICAL HISTORY:  Asthma      Eczema      No significant past surgical history        Current Issues: None   Postpartum Note -  Section POD#1    Allergies: NKDA/NKFA/NKEA    Blood Type: O+  Rubella immune  RPR negative    Patient w/o complaints. States to be exhibiting mild pain today post-op. Experiences stretching pain when ambulating. Pt is OOB, ambulating w/o complaints, passing flatus and has minimal bleeding post-op. Denies CP/SOB. Lochia WNL, sleeping well.    O:   ICU Vital Signs Last 24 Hrs  T(C): 36.7 (02 May 2023 06:20), Max: 36.8 (01 May 2023 09:26)  T(F): 98 (02 May 2023 06:20), Max: 98.2 (01 May 2023 09:26)  HR: 103 (02 May 2023 06:20) (91 - 109)  BP: 113/70 (02 May 2023 06:20) (95/64 - 116/74)  BP(mean): --  ABP: --  ABP(mean): --  RR: 16 (02 May 2023 06:20) (16 - 18)  SpO2: 100% (02 May 2023 06:20) (98% - 100%)    O2 Parameters below as of 01 May 2023 21:09  Patient On (Oxygen Delivery Method): room air      I&O's Summary    01 May 2023 07:01  -  02 May 2023 07:00  --------------------------------------------------------  IN: 0 mL / OUT: 1950 mL / NET: -1950 mL      Gen: NAD  Heart: normal S1S2, RRR  Lungs: CTA b/l. No wheezing, rhonchi crackles  Abdomen: Soft, nontender, nondistended, BS x4Q, fundus firm  Incision: Clean, dry and intact. Negative erythema/edema/ecchymosis  Ext: Negative homans sign b/l. Extremities warm to touch    Labs: pending      PAST MEDICAL & SURGICAL HISTORY:  Asthma      Eczema      No significant past surgical history        Current Issues: None

## 2023-05-03 ENCOUNTER — TRANSCRIPTION ENCOUNTER (OUTPATIENT)
Age: 28
End: 2023-05-03

## 2023-05-03 VITALS
SYSTOLIC BLOOD PRESSURE: 99 MMHG | OXYGEN SATURATION: 99 % | HEART RATE: 95 BPM | RESPIRATION RATE: 18 BRPM | DIASTOLIC BLOOD PRESSURE: 66 MMHG | TEMPERATURE: 98 F

## 2023-05-03 PROCEDURE — 36415 COLL VENOUS BLD VENIPUNCTURE: CPT

## 2023-05-03 PROCEDURE — 85025 COMPLETE CBC W/AUTO DIFF WBC: CPT

## 2023-05-03 PROCEDURE — 86780 TREPONEMA PALLIDUM: CPT

## 2023-05-03 PROCEDURE — 85014 HEMATOCRIT: CPT

## 2023-05-03 PROCEDURE — 99221 1ST HOSP IP/OBS SF/LOW 40: CPT

## 2023-05-03 PROCEDURE — 59050 FETAL MONITOR W/REPORT: CPT

## 2023-05-03 PROCEDURE — 86900 BLOOD TYPING SEROLOGIC ABO: CPT

## 2023-05-03 PROCEDURE — 85027 COMPLETE CBC AUTOMATED: CPT

## 2023-05-03 PROCEDURE — 86850 RBC ANTIBODY SCREEN: CPT

## 2023-05-03 PROCEDURE — 85018 HEMOGLOBIN: CPT

## 2023-05-03 PROCEDURE — 86769 SARS-COV-2 COVID-19 ANTIBODY: CPT

## 2023-05-03 PROCEDURE — 86901 BLOOD TYPING SEROLOGIC RH(D): CPT

## 2023-05-03 PROCEDURE — 59025 FETAL NON-STRESS TEST: CPT

## 2023-05-03 RX ORDER — FERROUS SULFATE 325(65) MG
1 TABLET ORAL
Qty: 0 | Refills: 0 | DISCHARGE
Start: 2023-05-03

## 2023-05-03 RX ORDER — IBUPROFEN 200 MG
1 TABLET ORAL
Qty: 0 | Refills: 0 | DISCHARGE
Start: 2023-05-03

## 2023-05-03 RX ORDER — ASCORBIC ACID 60 MG
1 TABLET,CHEWABLE ORAL
Qty: 0 | Refills: 0 | DISCHARGE
Start: 2023-05-03

## 2023-05-03 RX ADMIN — Medication 500 MILLIGRAM(S): at 06:09

## 2023-05-03 RX ADMIN — Medication 975 MILLIGRAM(S): at 03:15

## 2023-05-03 RX ADMIN — Medication 975 MILLIGRAM(S): at 08:40

## 2023-05-03 RX ADMIN — Medication 600 MILLIGRAM(S): at 06:10

## 2023-05-03 RX ADMIN — Medication 975 MILLIGRAM(S): at 09:00

## 2023-05-03 RX ADMIN — Medication 600 MILLIGRAM(S): at 06:31

## 2023-05-03 RX ADMIN — Medication 600 MILLIGRAM(S): at 00:30

## 2023-05-03 RX ADMIN — Medication 325 MILLIGRAM(S): at 06:09

## 2023-05-03 RX ADMIN — Medication 975 MILLIGRAM(S): at 02:45

## 2023-05-03 NOTE — PROGRESS NOTE ADULT - SUBJECTIVE AND OBJECTIVE BOX
Postpartum Note,  Section  She is a  27y woman who is now post-operative day: 3    Subjective:  The patient feels well.  She is ambulating.   She is tolerating regular diet.  She denies nausea and vomiting.  She is voiding.  Her pain is controlled.  She reports normal postpartum bleeding.  She is breastfeeding.  She is formula feeding.    Physical exam:    Vital Signs Last 24 Hrs  T(C): 36.7 (03 May 2023 05:45), Max: 36.8 (02 May 2023 17:12)  T(F): 98 (03 May 2023 05:45), Max: 98.2 (02 May 2023 17:12)  HR: 95 (03 May 2023 05:45) (95 - 108)  BP: 99/66 (03 May 2023 05:45) (99/66 - 107/70)  BP(mean): --  RR: 18 (03 May 2023 05:45) (17 - 18)  SpO2: 99% (03 May 2023 05:45) (98% - 99%)    Parameters below as of 03 May 2023 05:45  Patient On (Oxygen Delivery Method): room air        Gen: NAD  Breast: Soft, nontender, not engorged.  Abdomen: Soft, nontender, no distension , firm uterine fundus at umbilicus.  Incision: Clean, dry, and intact with steri strips  Pelvic: Normal lochia noted  Ext: No calf tenderness    LABS:                        7.9    13.44 )-----------( 218      ( 02 May 2023 12:34 )             23.5       Rubella status:     Allergies    No Known Allergies      MEDICATIONS  (STANDING):  acetaminophen     Tablet .. 975 milliGRAM(s) Oral <User Schedule>  ascorbic acid 500 milliGRAM(s) Oral three times a day  diphtheria/tetanus/pertussis (acellular) Vaccine (Adacel) 0.5 milliLiter(s) IntraMuscular once  ferrous    sulfate 325 milliGRAM(s) Oral three times a day  heparin   Injectable 5000 Unit(s) SubCutaneous every 12 hours  ibuprofen  Tablet. 600 milliGRAM(s) Oral every 6 hours  lactated ringers. 1000 milliLiter(s) (125 mL/Hr) IV Continuous <Continuous>  oxytocin Infusion 333.333 milliUNIT(s)/Min (1000 mL/Hr) IV Continuous <Continuous>  oxytocin Infusion 333.333 milliUNIT(s)/Min (1000 mL/Hr) IV Continuous <Continuous>  prenatal multivitamin 1 Tablet(s) Oral daily    MEDICATIONS  (PRN):  diphenhydrAMINE 25 milliGRAM(s) Oral every 6 hours PRN Pruritus  lanolin Ointment 1 Application(s) Topical every 6 hours PRN Sore Nipples  magnesium hydroxide Suspension 30 milliLiter(s) Oral two times a day PRN Constipation  oxyCODONE    IR 5 milliGRAM(s) Oral every 3 hours PRN Moderate to Severe Pain (4-10)  oxyCODONE    IR 5 milliGRAM(s) Oral once PRN Moderate to Severe Pain (4-10)  simethicone 80 milliGRAM(s) Chew every 4 hours PRN Gas        Assessment and Plan  POD #3 s/p  section  Doing well.  Encourage ambulation.  d-c home  slofe bid  full inst given  f-u in office 2 wks

## 2023-05-03 NOTE — PROGRESS NOTE ADULT - PROBLEM SELECTOR PLAN 1
Increase OOB  Regular diet  AM CBC  PO Pain Protocol  Routine Postop/Postpartum care  Discharge Planning
Increase OOB  PO pain protocol in place  Voiding freely  Regular diet  AM CBC pending  Continue routine prenatal care.

## 2023-05-03 NOTE — DISCHARGE NOTE OB - NS MD DC FALL RISK RISK
For information on Fall & Injury Prevention, visit: [email or web address] OR  [email or web address]://[email or web address].[email or web address]-[email or web address]-[email or web address]-injury OR  https://www.cdc.gov/steadi/patient.html For information on Fall & Injury Prevention, visit: https://www.[email or web address]/news/fall-[email or web address]-[email or web address]-maintains-health-[email or web address]-mobility OR  https://www.[email or web address]/news/fall-prevention-tips-to-avoid-injury OR  https://www.cdc.gov/steadi/patient.html For information on Fall & Injury Prevention, visit: https://www.[facility]/news/fall-prevention-protects-and-maintains-health-and-mobility OR  https://www.[email or web address]/news/fall-prevention-tips-to-avoid-injury OR  https://www.cdc.gov/steadi/patient.html

## 2023-05-03 NOTE — DISCHARGE NOTE OB - PATIENT PORTAL LINK FT
You can access the FollowMyHealth Patient Portal offered by [facility] by registering at the following website: [email or web address]. By joining [employer]’s FollowMyHealth portal, you will also be able to view your health information using other applications (apps) compatible with our system. You can access the FollowMyHealth Patient Portal offered by [facility] by registering at the following website: [email or web address]. By joining [employer]’s FollowMyHealth portal, you will also be able to view your health information using other applications (apps) compatible with our system. You can access the FollowMyHealth Patient Portal offered by [facility] by registering at the following website: [email or web address]. By joining [employer]’s FollowMyHealth portal, you will also be able to view your health information using other applications (apps) compatible with our system.

## 2023-05-03 NOTE — DISCHARGE NOTE OB - CARE PROVIDER_API CALL
Madhavi Graves)  Obstetrics and Gynecology  [address] [place] [address]  Phone: [phone]  Fax: [phone]  Follow Up Time:    Madhavi Graves)  Obstetrics and Gynecology  [address] [place] [address]  Phone: [phone]  Fax: [phone]  Follow Up Time:    Madhavi Graves)  Obstetrics and Gynecology  [address] [place] [address]  Phone: [phone]  Fax: [phone]  Follow Up Time:

## 2023-05-03 NOTE — DISCHARGE NOTE OB - MEDICATION SUMMARY - MEDICATIONS TO TAKE
I will START or STAY ON the medications listed below when I get home from the hospital:    ibuprofen 600 mg oral tablet  -- 1 tab(s) by mouth every 6 hours  -- Indication: For  delivery delivered    Prenatal Multivitamins with Folic Acid 1 mg oral tablet  -- 1 tab(s) by mouth once a day  -- Indication: For  delivery delivered    ferrous sulfate 325 mg (65 mg elemental iron) oral tablet  -- 1 tab(s) by mouth 3 times a day  -- Indication: For  delivery delivered    ascorbic acid 500 mg oral tablet  -- 1 tab(s) by mouth 3 times a day  -- Indication: For  delivery delivered

## 2023-05-03 NOTE — PROGRESS NOTE ADULT - SUBJECTIVE AND OBJECTIVE BOX
Postpartum Note-  Section POD#3    Allergies    No Known Allergies    Intolerances        Rubella IgG:    Immune                  RPR:               Negative            Blood Type:     O+    S:Patient is a  27y G 1   P  1  POD#3 S/P C/Sec  Subjective: Patient w/o complaints, pain is controlled.  Pt is OOB, tolerating PO, passing flatus. Lochia WNL.  The patient denies SOB, light-headedness, dizziness. The patient is able to ambulate without difficutly  Feeding: Breastfeeding    O:  Vital Signs Last 24 Hrs  T(C): 36.7 (03 May 2023 05:45), Max: 36.8 (02 May 2023 17:12)  T(F): 98 (03 May 2023 05:45), Max: 98.2 (02 May 2023 17:12)  HR: 95 (03 May 2023 05:45) (95 - 108)  BP: 99/66 (03 May 2023 05:45) (99/66 - 107/70)  BP(mean): --  RR: 18 (03 May 2023 05:45) (17 - 18)  SpO2: 99% (03 May 2023 05:45) (98% - 99%)    Parameters below as of 03 May 2023 05:45  Patient On (Oxygen Delivery Method): room air         Gen: NAD  CV: slight tachycardic, CTABL  Abdomen: Soft, nontender, non-distended, fundus firm.  Bowel Sounds x 4 quadrants  Incision: Clean, dry, and intact.  Negative erythema/edema/ecchymosis   Sub Q  Lochia WNL  Ext:  Neg Edema, Neg Calf tenderness. Pedal pulses palpated B/L    LABS:                          7.9    13.44 )-----------( 218      ( 02 May 2023 12:34 )             23.5       A/P:  27y  POD # 3 S/P primary  section, doing well    PMHx: none  Current Issues: anemia from acute blood loss during surgery, H/H: 7.9/23.5, patient is asympomatic    Increase OOB  Regular diet  AM CBC  PO Pain Protocol  Routine Postop/Postpartum care  Discharge Planning

## 2023-05-09 ENCOUNTER — APPOINTMENT (OUTPATIENT)
Dept: OBGYN | Facility: CLINIC | Age: 28
End: 2023-05-09
Payer: COMMERCIAL

## 2023-05-09 VITALS — SYSTOLIC BLOOD PRESSURE: 108 MMHG | DIASTOLIC BLOOD PRESSURE: 72 MMHG

## 2023-05-09 PROCEDURE — 0503F POSTPARTUM CARE VISIT: CPT

## 2023-06-14 ENCOUNTER — APPOINTMENT (OUTPATIENT)
Dept: OBGYN | Facility: CLINIC | Age: 28
End: 2023-06-14

## 2023-06-21 ENCOUNTER — APPOINTMENT (OUTPATIENT)
Dept: OBGYN | Facility: CLINIC | Age: 28
End: 2023-06-21
Payer: COMMERCIAL

## 2023-06-21 VITALS
SYSTOLIC BLOOD PRESSURE: 108 MMHG | BODY MASS INDEX: 25.71 KG/M2 | DIASTOLIC BLOOD PRESSURE: 74 MMHG | WEIGHT: 160 LBS | HEIGHT: 66 IN

## 2023-06-21 PROCEDURE — 0503F POSTPARTUM CARE VISIT: CPT

## 2023-06-22 LAB — HPV HIGH+LOW RISK DNA PNL CVX: NOT DETECTED

## 2023-07-07 LAB — CYTOLOGY CVX/VAG DOC THIN PREP: NORMAL

## 2023-08-21 ENCOUNTER — NON-APPOINTMENT (OUTPATIENT)
Age: 28
End: 2023-08-21

## 2024-01-01 NOTE — OB RN TRIAGE NOTE - FALL HARM RISK - PATIENT NEEDS ASSISTANCE
Problem: Physiological Stability  Goal: Vital signs and physical assessments stable and within expected parameters  Outcome: Monitoring/Evaluating progress  Goal: Remains free of signs and symptoms of infection  Outcome: Monitoring/Evaluating progress     Problem: Thermoregulation  Goal: Body temperature maintained within normal range  Outcome: Monitoring/Evaluating progress     Problem: Pain  Goal: Acceptable level of comfort exhibited by infant (based on N-Pass/NIPS Scoring)  Outcome: Monitoring/Evaluating progress     Problem: Oxygenation/Respiratory Function  Goal: Optimized respiratory function and gas exchange  Outcome: Monitoring/Evaluating progress  Goal: Parent / caregiver verbalizes understanding of infant's respiratory condition and treatment  Description: Document on Patient Education Activity  Outcome: Monitoring/Evaluating progress     Problem: Skin Integrity  Goal: Skin integrity is maintained or improved (skin will be intact without erythma or breakdown)  Outcome: Monitoring/Evaluating progress     Problem: Alteration in Family Bonding  Goal: Family Interaction is supported  Outcome: Monitoring/Evaluating progress  Goal: Family demonstrates appropriate coping mechanisms  Outcome: Monitoring/Evaluating progress     Problem: Nutrition  Goal: Tolerates feedings  Outcome: Monitoring/Evaluating progress  Goal: Consumes sufficient dietary intake without complications  Outcome: Monitoring/Evaluating progress  Goal: Progresses toward ability to receive all feeding from breast or bottle  Outcome: Monitoring/Evaluating progress  Goal: Achieves catch up weight gain and growth consistent with birth weight percentile  Outcome: Monitoring/Evaluating progress  Goal: Parent/ caregiver verbalizes understanding of milk preparation,  storage and bottle feeding techniques  Description: Document on Patient Education Activity  Outcome: Monitoring/Evaluating progress     Problem: Breastfeeding  Goal: Breast milk supply  is established and maintained to provide breast milk to infant in accordance with mother's preference  Outcome: Monitoring/Evaluating progress  Goal: Successful breastfeeding as evidenced by proper latch and adequate suck/ swallow  Outcome: Monitoring/Evaluating progress  Goal: Parent / caregiver verbalizes understanding of benefits of exclusive breastfeeding and breastfeeding techniques  Description: Document on Patient Education Activity  Outcome: Monitoring/Evaluating progress     Problem: Risk of altered growth and development secondary to gestational age or condition  Goal: Demonstrates improved/ appropriate age-corrected neurobehavioral competence at time of discharge or will be referred for ongoing therapy  Outcome: Monitoring/Evaluating progress  Goal: Parent / caregiver verbalizes understanding of developmentally appropriate interaction & environment  Description: Document on Patient Education Activity  Outcome: Monitoring/Evaluating progress  Goal: Parent / caregiver verbalizes understanding of risk for RSV and prevention  Description: Document on Patient Education Activity  Outcome: Monitoring/Evaluating progress      No assistance needed

## 2024-03-20 NOTE — DISCHARGE NOTE OB - CONDITION (STATED IN TERMS THAT PERMIT A SPECIFIC MEASURABLE COMPARISON WITH CONDITION ON ADMISSION):
"PAL received incoming call from pt   -Requesting order for DEXA scan and lab for serum calcium, pt states \"I want to make sure my bones are in good condition to move forward with these upcoming orthopedic surgeries'   -PAL reviewed 3/14/24 OV notes with pt, pt scheduled for appt with [facility] 3/28/24  -Scheduled pt for AWV with Dr. Gamboa 7/2/24, establish care as new PCP    Order pended, routing to Dr. Noel to review and advise    Jazmin STEIN RN  Patient Advocate Liaison - PAL RN  [facility]  [phone]   "
Calcium normal last week  He will have to sign a Medicare waiver and pay for a DEXA out of pocket  Epic provides no applicable Diagnosis to generate Medicare coverage  Chad Noel MD    
PAL called and spoke with pt   -Relayed message from Dr. Noel, see previous note   -PAL advised pt to contact ortho doctor for further questions/concerns regarding shoulder surgery and bone health   -Provided phone number for consumer price line [phone], pt can get estimate for DEXA scan out of pocket cost     Patient was given an opportunity to ask questions, verbalized understanding of plan, and is agreeable.     Jazmin STEIN RN  Patient Advocate Liaison - PAL RN  [facility]  [phone]   
stable